# Patient Record
Sex: MALE | Race: WHITE | NOT HISPANIC OR LATINO | Employment: FULL TIME | RURAL
[De-identification: names, ages, dates, MRNs, and addresses within clinical notes are randomized per-mention and may not be internally consistent; named-entity substitution may affect disease eponyms.]

---

## 2021-08-13 ENCOUNTER — TELEPHONE (OUTPATIENT)
Dept: FAMILY MEDICINE CLINIC | Facility: CLINIC | Age: 61
End: 2021-08-13

## 2021-08-13 NOTE — TELEPHONE ENCOUNTER
Patient's wife wyatt stating patient was seen at [facility] ER 8/12/21 for a possible torn  Rotator cuff and needed an ER follow up to get a referral. Patient has not been seen in 5 years in this office. Please advise

## 2021-08-18 ENCOUNTER — OFFICE VISIT (OUTPATIENT)
Dept: ORTHOPEDIC SURGERY | Facility: CLINIC | Age: 61
End: 2021-08-18

## 2021-08-18 VITALS
BODY MASS INDEX: 31.78 KG/M2 | WEIGHT: 222 LBS | HEIGHT: 70 IN | DIASTOLIC BLOOD PRESSURE: 82 MMHG | HEART RATE: 73 BPM | SYSTOLIC BLOOD PRESSURE: 153 MMHG

## 2021-08-18 DIAGNOSIS — M25.512 ACUTE PAIN OF LEFT SHOULDER: Primary | ICD-10-CM

## 2021-08-18 PROCEDURE — 99203 OFFICE O/P NEW LOW 30 MIN: CPT | Performed by: ORTHOPAEDIC SURGERY

## 2021-08-18 NOTE — PROGRESS NOTES
"     Patient ID: Francis Dunlap is a 61 y.o. male.    Chief Complaint:    Chief Complaint   Patient presents with   • Left Shoulder - Initial Evaluation       HPI:  Francis is a 60 yo gentleman with left shoulder pain after a recent fall at work when he caught himself with the left arm. He has had pain and difficulty lifting his arm ever since.  Is a little better in a sling  History reviewed. No pertinent past medical history.    History reviewed. No pertinent surgical history.    History reviewed. No pertinent family history.       Social History     Occupational History   • Not on file   Tobacco Use   • Smoking status: Never Smoker   Substance and Sexual Activity   • Alcohol use: Yes   • Drug use: Not on file   • Sexual activity: Not on file      Review of Systems   Cardiovascular: Negative for chest pain.   Musculoskeletal: Positive for arthralgias.       Objective:    /82   Pulse 73   Ht 177.8 cm (70\")   Wt 101 kg (222 lb)   BMI 31.85 kg/m²     Physical Examination:  Left shoulder has no bruising and intact skin with pain in the impingement area. Passive elevation 160 abduction 110, external rotation 20, internal rotation left hip.  He has pain and weakness on speed cardenas supraspinatus testing.  Belly press and liftoff are 4/5.  Sensory and motor exam are intact all distributions. Radial pulse is palpable and capillary refill is less than two seconds to all digits    Imaging:  left Shoulder X-Ray  Indication: left shoulder pain after fall at work  AP Y and Lateral views  Findings: well maintained joint spaces no fracture  no bony lesion  Soft tissues normal  normal joint spaces  Hardware appropriately positioned not applicable      no prior studies available for comparison.    Assessment:  Left shoulder pain possible cuff tear    Plan:  I recommend MRI to evaluate his cuff      Procedures         Disclaimer: Please note that areas of this note were completed with computer voice recognition software.  " Quite often unanticipated grammatical, syntax, homophones, and other interpretive errors are inadvertently transcribed by the computer software. Please excuse any errors that have escaped final proofreading.

## 2021-08-18 NOTE — PATIENT INSTRUCTIONS
MRI follow-up instructions    Today at your office visit, Dr. Avendano recommended an MRI (magnetic resonance imaging) to evaluate your joint pain.  This requires a precertification process, which our office will do, and then we will contact you when it is approved and go over scheduling options.  We typically recommend these to be performed at [facility] or [facility].  If for some reason it is performed elsewhere please arrange to have that facility give you a disc with your images on it so Dr. Avendano can review it at your follow-up visit.    When checking out today we recommend making an appointment to go over your results in approximately two weeks.  If your MRI is done sooner than that we would be happy to schedule you sooner to go over your results, just contact us at [phone] or through the [facility] portal to let us know your MRI is completed.  Seeing you in person for the results gives us the best opportunity to look at your images together and explain the diagnosis and treatment options to best help you.

## 2021-09-10 ENCOUNTER — HOSPITAL ENCOUNTER (OUTPATIENT)
Dept: MRI IMAGING | Facility: HOSPITAL | Age: 61
Discharge: HOME | End: 2021-09-10
Admitting: ORTHOPAEDIC SURGERY

## 2021-09-10 DIAGNOSIS — M25.512 ACUTE PAIN OF LEFT SHOULDER: ICD-10-CM

## 2021-09-10 PROCEDURE — 73221 MRI JOINT UPR EXTREM W/O DYE: CPT

## 2021-09-13 ENCOUNTER — TELEPHONE (OUTPATIENT)
Dept: ORTHOPEDIC SURGERY | Facility: CLINIC | Age: 61
End: 2021-09-13

## 2021-09-13 NOTE — TELEPHONE ENCOUNTER
Caller: Francis Dunlap    Relationship to patient: Self    Best call back number: [phone]    Patient is needing: PATIENT IS REQUESTING MRI RESULTS. PLEASE CALL PATIENT WHEN AVAILABLE AND LET HIM KNOW IF NEEDS A FOLLOW UP. THANK YOU!

## 2021-09-15 ENCOUNTER — OFFICE VISIT (OUTPATIENT)
Dept: FAMILY MEDICINE CLINIC | Facility: CLINIC | Age: 61
End: 2021-09-15

## 2021-09-15 VITALS
WEIGHT: 221.4 LBS | BODY MASS INDEX: 31.7 KG/M2 | HEIGHT: 70 IN | DIASTOLIC BLOOD PRESSURE: 94 MMHG | SYSTOLIC BLOOD PRESSURE: 160 MMHG | OXYGEN SATURATION: 97 % | HEART RATE: 81 BPM | TEMPERATURE: 97.6 F | RESPIRATION RATE: 18 BRPM

## 2021-09-15 DIAGNOSIS — E66.9 OBESITY (BMI 30.0-34.9): ICD-10-CM

## 2021-09-15 DIAGNOSIS — R25.2 MUSCLE CRAMP: ICD-10-CM

## 2021-09-15 DIAGNOSIS — Z87.891 FORMER SMOKER: ICD-10-CM

## 2021-09-15 DIAGNOSIS — H61.22 IMPACTED CERUMEN OF LEFT EAR: ICD-10-CM

## 2021-09-15 DIAGNOSIS — Z12.5 SCREENING FOR MALIGNANT NEOPLASM OF PROSTATE: ICD-10-CM

## 2021-09-15 DIAGNOSIS — N52.9 ERECTILE DYSFUNCTION, UNSPECIFIED ERECTILE DYSFUNCTION TYPE: ICD-10-CM

## 2021-09-15 DIAGNOSIS — H61.22 IMPACTED CERUMEN, LEFT EAR: ICD-10-CM

## 2021-09-15 DIAGNOSIS — Z13.1 SCREENING FOR DIABETES MELLITUS: ICD-10-CM

## 2021-09-15 DIAGNOSIS — I10 ESSENTIAL HYPERTENSION: Primary | ICD-10-CM

## 2021-09-15 DIAGNOSIS — S46.002D INJURY OF LEFT ROTATOR CUFF, SUBSEQUENT ENCOUNTER: ICD-10-CM

## 2021-09-15 DIAGNOSIS — Z12.11 SCREEN FOR COLON CANCER: ICD-10-CM

## 2021-09-15 DIAGNOSIS — Z13.220 SCREENING, LIPID: ICD-10-CM

## 2021-09-15 PROBLEM — M19.90 ARTHRITIS: Status: ACTIVE | Noted: 2021-09-15

## 2021-09-15 PROBLEM — Z87.442 HISTORY OF KIDNEY STONES: Status: ACTIVE | Noted: 2021-09-15

## 2021-09-15 PROBLEM — N20.0 CALCULUS OF KIDNEY: Status: ACTIVE | Noted: 2021-09-15

## 2021-09-15 PROBLEM — B01.9 VARICELLA: Status: ACTIVE | Noted: 2021-09-15

## 2021-09-15 PROBLEM — Z86.19 HISTORY OF CHICKENPOX: Status: ACTIVE | Noted: 2021-09-15

## 2021-09-15 LAB
DEVELOPER EXPIRATION DATE: NORMAL
DEVELOPER LOT NUMBER: NORMAL
EXPIRATION DATE: NORMAL
FECAL OCCULT BLOOD SCREEN, POC: NEGATIVE
Lab: NORMAL
NEGATIVE CONTROL: NEGATIVE
POSITIVE CONTROL: POSITIVE

## 2021-09-15 PROCEDURE — 82270 OCCULT BLOOD FECES: CPT | Performed by: FAMILY MEDICINE

## 2021-09-15 PROCEDURE — 99204 OFFICE O/P NEW MOD 45 MIN: CPT | Performed by: FAMILY MEDICINE

## 2021-09-15 RX ORDER — SILDENAFIL CITRATE 20 MG/1
TABLET ORAL
Qty: 30 TABLET | Refills: 5 | Status: SHIPPED | OUTPATIENT
Start: 2021-09-15

## 2021-09-15 RX ORDER — LISINOPRIL 5 MG/1
5 TABLET ORAL DAILY
Qty: 90 TABLET | Refills: 2 | Status: SHIPPED | OUTPATIENT
Start: 2021-09-15

## 2021-09-16 ENCOUNTER — OFFICE VISIT (OUTPATIENT)
Dept: ORTHOPEDIC SURGERY | Facility: CLINIC | Age: 61
End: 2021-09-16

## 2021-09-16 VITALS — HEIGHT: 70 IN | WEIGHT: 221 LBS | BODY MASS INDEX: 31.64 KG/M2

## 2021-09-16 DIAGNOSIS — M25.512 ACUTE PAIN OF LEFT SHOULDER: Primary | ICD-10-CM

## 2021-09-16 PROCEDURE — 20610 DRAIN/INJ JOINT/BURSA W/O US: CPT | Performed by: ORTHOPAEDIC SURGERY

## 2021-09-16 PROCEDURE — 99213 OFFICE O/P EST LOW 20 MIN: CPT | Performed by: ORTHOPAEDIC SURGERY

## 2021-09-16 RX ORDER — TRIAMCINOLONE ACETONIDE 40 MG/ML
80 INJECTION, SUSPENSION INTRA-ARTICULAR; INTRAMUSCULAR ONCE
Status: COMPLETED | OUTPATIENT
Start: 2021-09-16 | End: 2021-09-16

## 2021-09-16 RX ADMIN — TRIAMCINOLONE ACETONIDE 80 MG: 40 INJECTION, SUSPENSION INTRA-ARTICULAR; INTRAMUSCULAR at 15:50

## 2021-09-16 NOTE — PROGRESS NOTES
"     Patient ID: Francis Dunlap is a 61 y.o. male.  Left shoulder pain  Returns with continued left shoulder pain    Review of Systems:  Left shoulder pain      Objective:    Ht 177.8 cm (70\")   Wt 100 kg (221 lb)   BMI 31.71 kg/m²     Physical Examination:   Left shoulder has no bruising and intact skin with pain in the impingement area. Passive elevation 160 abduction 110, external rotation 20, internal rotation left hip.  He has pain and weakness on speed cardenas supraspinatus testing.  Belly press and liftoff are 4/5.  Sensory and motor exam are intact all distributions. Radial pulse is palpable and capillary refill is less than two seconds to all digits       Imaging:   MRI demonstrates widespread cuff tendinopathy but overall intact tissue with bursitis AC joint arthritis with bony impingement    Assessment:    Left shoulder pain    Plan:   Treatment options discussed, recommend conservative treatment  I recommend injection after today's evaluation. Risks and benefits of the injection were discussed. Under sterile technique and after timeout and verbal consent I injected 80 mg of Kenalog and 2 mL of 1% Lidocaine in the shoulder and subacromial space. It was well tolerated.  See me as needed      Procedures          Disclaimer: Please note that areas of this note were completed with computer voice recognition software.  Quite often unanticipated grammatical, syntax, homophones, and other interpretive errors are inadvertently transcribed by the computer software. Please excuse any errors that have escaped final proofreading.  "

## 2021-09-19 LAB
ALBUMIN SERPL-MCNC: 4.4 G/DL (ref 3.8–4.8)
ALBUMIN/GLOB SERPL: 1.7 {RATIO} (ref 1.2–2.2)
ALP SERPL-CCNC: 109 IU/L (ref 44–121)
ALT SERPL-CCNC: 25 IU/L (ref 0–44)
AST SERPL-CCNC: 19 IU/L (ref 0–40)
BASOPHILS # BLD AUTO: 0 X10E3/UL (ref 0–0.2)
BASOPHILS NFR BLD AUTO: 0 %
BILIRUB SERPL-MCNC: 0.5 MG/DL (ref 0–1.2)
BUN SERPL-MCNC: 18 MG/DL (ref 8–27)
BUN/CREAT SERPL: 19 (ref 10–24)
CALCIUM SERPL-MCNC: 9.4 MG/DL (ref 8.6–10.2)
CHLORIDE SERPL-SCNC: 104 MMOL/L (ref 96–106)
CHOLEST SERPL-MCNC: 165 MG/DL (ref 100–199)
CO2 SERPL-SCNC: 24 MMOL/L (ref 20–29)
CREAT SERPL-MCNC: 0.93 MG/DL (ref 0.76–1.27)
EOSINOPHIL # BLD AUTO: 0 X10E3/UL (ref 0–0.4)
EOSINOPHIL NFR BLD AUTO: 0 %
ERYTHROCYTE [DISTWIDTH] IN BLOOD BY AUTOMATED COUNT: 13.1 % (ref 11.6–15.4)
GLOBULIN SER CALC-MCNC: 2.6 G/DL (ref 1.5–4.5)
GLUCOSE SERPL-MCNC: 108 MG/DL (ref 65–99)
HBA1C MFR BLD: 6 % (ref 4.8–5.6)
HCT VFR BLD AUTO: 46.4 % (ref 37.5–51)
HDLC SERPL-MCNC: 35 MG/DL
HGB BLD-MCNC: 15.2 G/DL (ref 13–17.7)
IMM GRANULOCYTES # BLD AUTO: 0.1 X10E3/UL (ref 0–0.1)
IMM GRANULOCYTES NFR BLD AUTO: 1 %
LDLC SERPL CALC-MCNC: 116 MG/DL (ref 0–99)
LYMPHOCYTES # BLD AUTO: 1.6 X10E3/UL (ref 0.7–3.1)
LYMPHOCYTES NFR BLD AUTO: 15 %
MAGNESIUM SERPL-MCNC: 2.3 MG/DL (ref 1.6–2.3)
MCH RBC QN AUTO: 30 PG (ref 26.6–33)
MCHC RBC AUTO-ENTMCNC: 32.8 G/DL (ref 31.5–35.7)
MCV RBC AUTO: 92 FL (ref 79–97)
MONOCYTES # BLD AUTO: 0.8 X10E3/UL (ref 0.1–0.9)
MONOCYTES NFR BLD AUTO: 7 %
NEUTROPHILS # BLD AUTO: 8.2 X10E3/UL (ref 1.4–7)
NEUTROPHILS NFR BLD AUTO: 77 %
PLATELET # BLD AUTO: 242 X10E3/UL (ref 150–450)
POTASSIUM SERPL-SCNC: 5 MMOL/L (ref 3.5–5.2)
PROT SERPL-MCNC: 7 G/DL (ref 6–8.5)
PSA SERPL-MCNC: 0.3 NG/ML (ref 0–4)
RBC # BLD AUTO: 5.07 X10E6/UL (ref 4.14–5.8)
SODIUM SERPL-SCNC: 140 MMOL/L (ref 134–144)
TRIGL SERPL-MCNC: 75 MG/DL (ref 0–149)
VLDLC SERPL CALC-MCNC: 14 MG/DL (ref 5–40)
WBC # BLD AUTO: 10.6 X10E3/UL (ref 3.4–10.8)

## 2021-09-24 NOTE — PATIENT INSTRUCTIONS
"Diabetes Care, 44(Suppl 1), S34-S39. https://doi.org/https://doi.org/10.2337/is13-G735\">   Prediabetes  Prediabetes is when your blood sugar (blood glucose) level is higher than normal but not high enough for you to be diagnosed with type 2 diabetes. Having prediabetes puts you at risk for developing type 2 diabetes (type 2 diabetes mellitus).  With certain lifestyle changes, you may be able to prevent or delay the onset of type 2 diabetes. This is important because type 2 diabetes can lead to serious complications, such as:  · Heart disease.  · Stroke.  · Blindness.  · Kidney disease.  · Depression.  · Poor circulation in the feet and legs. In severe cases, this could lead to surgical removal of a leg (amputation).  What are the causes?  The exact cause of prediabetes is not known. It may result from insulin resistance. Insulin resistance develops when cells in the body do not respond properly to insulin that the body makes. This can cause excess glucose to build up in the blood. High blood glucose (hyperglycemia) can develop.  What increases the risk?  The following factors may make you more likely to develop this condition:  · You have a family member with type 2 diabetes.  · You are older than 45 years.  · You had a temporary form of diabetes during a pregnancy (gestational diabetes).  · You had polycystic ovary syndrome (PCOS).  · You are overweight or obese.  · You are inactive (sedentary).  · You have a history of heart disease, including problems with cholesterol levels, high levels of blood fats, or high blood pressure.  What are the signs or symptoms?  You may have no symptoms. If you do have symptoms, they may include:  · Increased hunger.  · Increased thirst.  · Increased urination.  · Vision changes, such as blurry vision.  · Tiredness (fatigue).  How is this diagnosed?  This condition can be diagnosed with blood tests. Your blood glucose may be checked with one or more of the following tests:  · A " fasting blood glucose (FBG) test. You will not be allowed to eat (you will fast) for at least 8 hours before a blood sample is taken.  · An A1C blood test (hemoglobin A1C). This test provides information about blood glucose levels over the previous 2?3 months.  · An oral glucose tolerance test (OGTT). This test measures your blood glucose at two points in time:  ? After fasting. This is your baseline level.  ? Two hours after you drink a beverage that contains glucose.  You may be diagnosed with prediabetes if:  · Your FBG is 100?125 mg/dL (5.6-6.9 mmol/L).  · Your A1C level is 5.7?6.4% (39-46 mmol/mol).  · Your OGTT result is 140?199 mg/dL (7.8-11 mmol/L).  These blood tests may be repeated to confirm your diagnosis.  How is this treated?  Treatment may include dietary and lifestyle changes to help lower your blood glucose and prevent type 2 diabetes from developing. In some cases, medicine may be prescribed to help lower the risk of type 2 diabetes.  Follow these instructions at home:  Nutrition    · Follow a healthy meal plan. This includes eating lean proteins, whole grains, legumes, fresh fruits and vegetables, low-fat dairy products, and healthy fats.  · Follow instructions from your health care provider about eating or drinking restrictions.  · Meet with a dietitian to create a healthy eating plan that is right for you.    Lifestyle  · Do moderate-intensity exercise for at least 30 minutes a day on 5 or more days each week, or as told by your health care provider. A mix of activities may be best, such as:  ? Brisk walking, swimming, biking, and weight lifting.  · Lose weight as told by your health care provider. Losing 5-7% of your body weight can reverse insulin resistance.  · Do not drink alcohol if:  ? Your health care provider tells you not to drink.  ? You are pregnant, may be pregnant, or are planning to become pregnant.  · If you drink alcohol:  ? Limit how much you use to:  § 0-1 drink a day for  women.  § 0-2 drinks a day for men.  ? Be aware of how much alcohol is in your drink. In the [place] one drink equals one 12 oz bottle of beer (355 mL), one 5 oz glass of wine (148 mL), or one 1½ oz glass of hard liquor (44 mL).  General instructions  · Take over-the-counter and prescription medicines only as told by your health care provider. You may be prescribed medicines that help lower the risk of type 2 diabetes.  · Do not use any products that contain nicotine or tobacco, such as cigarettes, e-cigarettes, and chewing tobacco. If you need help quitting, ask your health care provider.  · Keep all follow-up visits. This is important.  Where to find more information  · American Diabetes Association: www.diabetes.org  · Academy of Nutrition and Dietetics: www.eatright.org  · American Heart Association: www.heart.org  Contact a health care provider if:  · You have any of these symptoms:  ? Increased hunger.  ? Increased urination.  ? Increased thirst.  ? Fatigue.  ? Vision changes, such as blurry vision.  Get help right away if you:  · Have shortness of breath.  · Feel confused.  · Vomit or feel like you may vomit.  Summary  · Prediabetes is when your blood sugar (blood glucose)level is higher than normal but not high enough for you to be diagnosed with type 2 diabetes.  · Having prediabetes puts you at risk for developing type 2 diabetes (type 2 diabetes mellitus).  · Make lifestyle changes such as eating a healthy diet and exercising regularly to help prevent diabetes. Lose weight as told by your health care provider.  This information is not intended to replace advice given to you by your health care provider. Make sure you discuss any questions you have with your health care provider.  Document Revised: 03/18/2021 Document Reviewed: 03/18/2021  Elsevier Patient Education © 2021 [employer] Inc[employer]

## 2021-09-29 ENCOUNTER — PATIENT ROUNDING (BHMG ONLY) (OUTPATIENT)
Dept: FAMILY MEDICINE CLINIC | Facility: CLINIC | Age: 61
End: 2021-09-29

## 2021-09-29 NOTE — PROGRESS NOTES
September 29, 2021    Hello, may I speak with Francis Dunlap?    My name is Danna Huang      I am  with [facility]  [address] [place] [address].    Before we get started may I verify your date of birth? 1960    I am calling to officially welcome you to our practice and ask about your recent visit. Is this a good time to talk? yes    Tell me about your visit with us. What things went well?  He thought everything went well.       We're always looking for ways to make our patients' experiences even better. Do you have recommendations on ways we may improve?  no    Overall were you satisfied with your first visit to our practice? yes       I appreciate you taking the time to speak with me today. Is there anything else I can do for you? no      Thank you, and have a great day.

## 2021-10-04 PROBLEM — E66.9 OBESITY (BMI 30.0-34.9): Status: ACTIVE | Noted: 2021-10-04

## 2021-10-04 PROBLEM — I10 ESSENTIAL HYPERTENSION: Status: ACTIVE | Noted: 2021-10-04

## 2021-10-04 PROBLEM — E66.811 OBESITY (BMI 30.0-34.9): Status: ACTIVE | Noted: 2021-10-04

## 2021-10-04 PROBLEM — N52.9 ERECTILE DYSFUNCTION: Status: ACTIVE | Noted: 2021-10-04

## 2022-01-24 ENCOUNTER — TELEPHONE (OUTPATIENT)
Dept: FAMILY MEDICINE CLINIC | Facility: CLINIC | Age: 62
End: 2022-01-24

## 2022-01-24 NOTE — TELEPHONE ENCOUNTER
Caller: JACINTO ANDREW    Relationship to patient: Emergency Contact    Best call back number: [phone]    Date of exposure: 1/16    Date of positive COVID19 test: 1/24    Date if possible COVID19 exposure: AT [employer]    COVID19 symptoms: HEADACHE, COUGH, RUNNY NOSE , CONGESTION, BODY ACHES, CHILLS AND STILL HAS TASTE AND SMELL    Date of initial quarantine: 1/24    Additional information or concerns: PLEASE CONTACT PATIENT BACK WITH CARE INSTRUCTIONS AND ANY MEDICATIONS,    What is the patients preferred pharmacy: [id number] [phone]

## 2022-01-24 NOTE — TELEPHONE ENCOUNTER
Most cases of COVID that we are seeing right now will resolve on their own in about 5-7 days.  Most patients take OTC cough and cold medications as needed for symptoms.  Take vitamins (Vit C, Zinc, Vit D) to help support the immune system.  Isolate for at least 5 days, wear mask around others for an additional 5 days.  Family members should quarantine.      If significant coughing, wheezing, shortness of breath, fever or any symptom for which he needs a prescription, OR if he is interested in an infusion or oral antiviral, we will need to set up an appointment to discuss options.

## 2022-01-25 NOTE — TELEPHONE ENCOUNTER
Spoke with Francis, gave information, he said he is using otc meds and he is feeling better, will call for an appointment if symptom would change.

## 2023-04-20 ENCOUNTER — OFFICE VISIT (OUTPATIENT)
Dept: FAMILY MEDICINE CLINIC | Facility: CLINIC | Age: 63
End: 2023-04-20
Payer: COMMERCIAL

## 2023-04-20 VITALS
HEART RATE: 60 BPM | BODY MASS INDEX: 29.2 KG/M2 | HEIGHT: 70 IN | DIASTOLIC BLOOD PRESSURE: 80 MMHG | TEMPERATURE: 98.3 F | RESPIRATION RATE: 16 BRPM | WEIGHT: 204 LBS | SYSTOLIC BLOOD PRESSURE: 137 MMHG | OXYGEN SATURATION: 96 %

## 2023-04-20 DIAGNOSIS — K21.00 GASTROESOPHAGEAL REFLUX DISEASE WITH ESOPHAGITIS WITHOUT HEMORRHAGE: ICD-10-CM

## 2023-04-20 DIAGNOSIS — H61.22 IMPACTED CERUMEN OF LEFT EAR: ICD-10-CM

## 2023-04-20 DIAGNOSIS — N48.6 PEYRONIE DISEASE: ICD-10-CM

## 2023-04-20 DIAGNOSIS — Z12.5 SCREENING FOR MALIGNANT NEOPLASM OF PROSTATE: ICD-10-CM

## 2023-04-20 DIAGNOSIS — H65.91 MIDDLE EAR EFFUSION, RIGHT: ICD-10-CM

## 2023-04-20 DIAGNOSIS — R73.03 PREDIABETES: ICD-10-CM

## 2023-04-20 DIAGNOSIS — E66.9 OBESITY (BMI 30.0-34.9): ICD-10-CM

## 2023-04-20 DIAGNOSIS — N52.9 ERECTILE DYSFUNCTION, UNSPECIFIED ERECTILE DYSFUNCTION TYPE: ICD-10-CM

## 2023-04-20 DIAGNOSIS — Z13.29 SCREENING FOR THYROID DISORDER: ICD-10-CM

## 2023-04-20 DIAGNOSIS — I10 ESSENTIAL HYPERTENSION: Primary | ICD-10-CM

## 2023-04-20 DIAGNOSIS — L98.9 SKIN LESIONS: ICD-10-CM

## 2023-04-20 DIAGNOSIS — M19.041 ARTHRITIS OF HAND, RIGHT: ICD-10-CM

## 2023-04-20 DIAGNOSIS — L98.9 SKIN LESION OF BACK: ICD-10-CM

## 2023-04-20 DIAGNOSIS — Z12.11 SCREENING FOR COLON CANCER: ICD-10-CM

## 2023-04-20 DIAGNOSIS — K40.90 INGUINAL HERNIA OF RIGHT SIDE WITHOUT OBSTRUCTION OR GANGRENE: ICD-10-CM

## 2023-04-20 LAB
EXPIRATION DATE: NORMAL
HBA1C MFR BLD: 5.5 %
Lab: NORMAL

## 2023-04-20 RX ORDER — SILDENAFIL CITRATE 20 MG/1
20 TABLET ORAL DAILY PRN
Qty: 30 TABLET | Refills: 5 | Status: SHIPPED | OUTPATIENT
Start: 2023-04-20

## 2023-04-20 RX ORDER — AZITHROMYCIN 250 MG/1
TABLET, FILM COATED ORAL
Qty: 6 TABLET | Refills: 0 | Status: SHIPPED | OUTPATIENT
Start: 2023-04-20

## 2023-04-20 RX ORDER — LISINOPRIL 10 MG/1
10 TABLET ORAL DAILY
Qty: 90 TABLET | Refills: 3 | Status: SHIPPED | OUTPATIENT
Start: 2023-04-20

## 2023-04-20 NOTE — PROGRESS NOTES
Francis Dunlap is a 63 y.o. male. Presents to [facility] for   Chief Complaint   Patient presents with   • Establish Care   • Hand Pain       Rooming Tab(CC,VS,Pt Hx,Fall Screen)    SUBJECTIVE:    Patient is here to establish care. He is here with his wife Pattie. Consent given to speak regarding his health with her present.         Hand Pain   The incident occurred more than 1 week ago. The pain is present in the right fingers. The quality of the pain is described as aching and shooting. The pain is mild. The pain has been constant since the incident. Associated symptoms include muscle weakness. Pertinent negatives include no chest pain, numbness or tingling. The symptoms are aggravated by movement. He has tried nothing for the symptoms. The treatment provided no relief.      Reports hand pain with weather change.  Reports his pain is getting worse.  Right knuckles are inflamed.  He denies any history of gout.  Rates pain a 3-4.  He reports localized shooting pain to the hand when he hits it accidentally or jams his finger.  He does have a history of right tendon repair greater than twenty years ago. 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          Patient Active Problem List    Diagnosis    • Peyronie disease [N48.6]    • Inguinal hernia of right side without obstruction or gangrene [K40.90]    • Gastroesophageal reflux disease with esophagitis without hemorrhage [K21.00]    • Obesity (BMI 30.0-34.9) [E66.9]    • Erectile dysfunction [N52.9]    • Essential hypertension [I10]    • Arthritis [M19.90]    • Former smoker [Z87.891]    • History of chickenpox [Z86.19]    • History of kidney stones [Z87.442]        No Known Allergies    Medication List:  Current Outpatient Medications on File Prior to Visit   Medication Sig Dispense Refill   • [DISCONTINUED] lisinopril (PRINIVIL,ZESTRIL) 5 MG tablet Take 1 tablet by mouth Daily. 90 tablet 2   •  "[DISCONTINUED] sildenafil (REVATIO) 20 MG tablet Take 1 to 5 tablets 30 minutes before intercourse. 30 tablet 5     No current facility-administered medications on file prior to visit.     Current outpatient and discharge medications have been reconciled for the patient.  Reviewed by: ZEINA Lan      Review of Systems   Constitutional: Negative for chills, fatigue and fever.   HENT: Positive for hearing loss and trouble swallowing (occasional choking sensation ). Negative for congestion.    Eyes: Positive for visual disturbance (age related.).   Respiratory: Positive for wheezing. Negative for cough and shortness of breath.         \"Barrel lungs\"    Cardiovascular: Negative for chest pain, palpitations and leg swelling.   Gastrointestinal: Positive for diarrhea and GERD. Negative for abdominal distention, abdominal pain, anal bleeding, blood in stool, constipation, nausea, vomiting and indigestion.        Inguinal hernia    Genitourinary: Positive for decreased libido, penile pain and erectile dysfunction. Negative for difficulty urinating, discharge, flank pain, frequency, genital sores, hematuria and testicular pain.        Prolonged urination    Musculoskeletal: Positive for joint swelling (right hand joint swelling ).   Skin: Positive for skin lesions.        Tanned skin    Allergic/Immunologic: Negative for environmental allergies and food allergies.   Neurological: Negative for dizziness, tingling, facial asymmetry, weakness, numbness and confusion.   Psychiatric/Behavioral: Negative for self-injury, sleep disturbance, suicidal ideas, depressed mood and stress. The patient is not nervous/anxious.        Past Medical History:   Diagnosis Date   • Hypertension        No past medical history pertinent negatives.    History reviewed. No pertinent surgical history.    Social History     Socioeconomic History   • Marital status:    Tobacco Use   • Smoking status: Former     Packs/day: 1.00     " "Years: 20.00     Pack years: 20.00     Types: Cigarettes     Quit date: 2008     Years since quitting: 15.3   • Smokeless tobacco: Never   Vaping Use   • Vaping Use: Never used   Substance and Sexual Activity   • Alcohol use: Never   • Drug use: Never   • Sexual activity: Defer       Family History   Problem Relation Age of Onset   • Lung cancer Mother    • Heart disease Mother        OBJECTIVE:    Vitals:    04/20/23 1101   BP: 137/80   BP Location: Left arm   Patient Position: Sitting   Cuff Size: Large Adult   Pulse: 60   Resp: 16   Temp: 98.3 °F (36.8 °C)   TempSrc: Infrared   SpO2: 96%   Weight: 92.5 kg (204 lb)   Height: 177.8 cm (70\")       Estimated body mass index is 29.27 kg/m² as calculated from the following:    Height as of this encounter: 177.8 cm (70\").    Weight as of this encounter: 92.5 kg (204 lb).   **  Physical Exam  Vitals and nursing note reviewed.   Constitutional:       General: He is not in acute distress.     Appearance: Normal appearance. He is well-groomed and normal weight. He is not ill-appearing, toxic-appearing or diaphoretic.   HENT:      Head: Normocephalic and atraumatic.      Right Ear: Tenderness present. There is impacted cerumen.      Left Ear: No tenderness. There is impacted cerumen.      Nose: Nose normal.      Right Sinus: No maxillary sinus tenderness or frontal sinus tenderness.      Left Sinus: No maxillary sinus tenderness or frontal sinus tenderness.      Mouth/Throat:      Lips: Pink.      Mouth: Mucous membranes are moist.   Eyes:      General: Vision grossly intact. Gaze aligned appropriately.   Neck:      Vascular: No carotid bruit.   Cardiovascular:      Rate and Rhythm: Normal rate and regular rhythm.      Heart sounds: Normal heart sounds. No murmur heard.  Pulmonary:      Effort: Pulmonary effort is normal.      Breath sounds: Normal breath sounds and air entry.   Musculoskeletal:      Right lower leg: No edema.      Left lower leg: No edema.   Skin:     " General: Skin is warm and dry.      Capillary Refill: Capillary refill takes less than 2 seconds.      Findings: Lesion present.             Comments: Multiple macular papular lesions noted to body.Concerning macular paupular lesion approx 4mm in size, irregular border to right upper back.     Skin reddened/tanned    Neurological:      Mental Status: He is alert and oriented to person, place, and time. Mental status is at baseline.   Psychiatric:         Attention and Perception: Attention normal.         Mood and Affect: Mood normal.         Behavior: Behavior normal. Behavior is cooperative.       Derm Physical Exam    Result Review :                    PHQ-9 Total Score:             ASSESSMENT/ PLAN:    Data Reviewed:   Assessment and Plan      Diagnoses and all orders for this visit:    1. Essential hypertension (Primary)  Comments:  Home b/p ranges - 152/88 today, has been 170/92.  Orders:  -     CBC & Differential  -     Comprehensive Metabolic Panel  -     Lipid Panel  -     POC Urinalysis Dipstick, Multipro  -     lisinopril (PRINIVIL,ZESTRIL) 10 MG tablet; Take 1 tablet by mouth Daily.  Dispense: 90 tablet; Refill: 3    2. Erectile dysfunction, unspecified erectile dysfunction type  Comments:  Able to achieve a soft erection, but unable to sustain and ejaculate.   Orders:  -     sildenafil (REVATIO) 20 MG tablet; Take 1 tablet by mouth Daily As Needed (ed). Take 1 to 5 tablets 30 minutes before intercourse.  Dispense: 30 tablet; Refill: 5  -     Ambulatory Referral to Urology    3. Inguinal hernia of right side without obstruction or gangrene  Comments:  Patient denies concern or pain.     4. Gastroesophageal reflux disease with esophagitis without hemorrhage    5. Peyronie disease  Comments:  2-3 years with spouse reporting worsening of a change in the shape of his penis - they report a curvature of the penis with erection.   Overview:  2-3 years with spouse reporting worsening of a change in the shape of  his penis - they report a curvature of the penis with erection.     Orders:  -     Ambulatory Referral to Urology    6. Arthritis of hand, right  Comments:  Pending lab results will prescribe medication. Refuses referral to hand specialist.     7. Prediabetes  -     POC Glycosylated Hemoglobin (Hb A1C)    8. Obesity (BMI 30.0-34.9)    9. Middle ear effusion, right  -     azithromycin (Zithromax Z-West) 250 MG tablet; Take 2 tablets by mouth on day 1, then 1 tablet daily on days 2-5  Dispense: 6 tablet; Refill: 0    10. Screening for malignant neoplasm of prostate  -     PSA Screen    11. Skin lesion of back  Comments:  macular paupular lesion approx 4mm in size, irregular border.   Orders:  -     Ambulatory Referral to Dermatology    12. Skin lesions  Comments:  Multiple macular paupular lesions to bilateral arms.   Orders:  -     Ambulatory Referral to Dermatology    13. Impacted cerumen of left ear  -     Ear Cerumen Removal    14. Screening for thyroid disorder  -     TSH    15. Screening for colon cancer  Comments:  Colonoscopy 4/28/2016 [facility]. hyperplastic polyp.   Was supposed to return in three years.   Orders:  -     Ambulatory Referral For Screening Colonoscopy       Essential hypertension [I10]             Wrapup Tab  Follow Up   Requested Prescriptions     Signed Prescriptions Disp Refills   • lisinopril (PRINIVIL,ZESTRIL) 10 MG tablet 90 tablet 3     Sig: Take 1 tablet by mouth Daily.   • sildenafil (REVATIO) 20 MG tablet 30 tablet 5     Sig: Take 1 tablet by mouth Daily As Needed (ed). Take 1 to 5 tablets 30 minutes before intercourse.   • azithromycin (Zithromax Z-West) 250 MG tablet 6 tablet 0     Sig: Take 2 tablets by mouth on day 1, then 1 tablet daily on days 2-5     Medications Discontinued During This Encounter   Medication Reason   • lisinopril (PRINIVIL,ZESTRIL) 5 MG tablet    • sildenafil (REVATIO) 20 MG tablet      Return for Annual physical. Next appointment with this provider is Visit date  not found.      Patient was given instructions and counseling regarding his condition or for health maintenance advice. Please see specific information pulled into the AVS if appropriate.    Patient Instructions   Continue current plan of care as discussed.   Take medication as ordered (if applicable).    Practice good sleep hygiene.  Eat a well balanced diet with fresh fruit and vegetables.    Drink at least 8 bottles of water or equivalent per day.     Limit sweetened beverages, sodas, juices.    Bake, boil, broil or grill your food, avoid eating fried foods.   Exercise at least 150 minutes per week.        Hand hygiene was performed during entrance to exam room and following assessment of patient. This document is intended for medical expert use only.     EMR Dragon/Transcription disclaimer:   Much of this encounter note is an electronic transcription/translation of spoken language to printed text. The electronic translation of spoken language may permit erroneous, or at times, nonsensical words or phrases to be inadvertently transcribed.

## 2023-04-22 LAB
ALBUMIN SERPL-MCNC: 4.2 G/DL (ref 3.8–4.8)
ALBUMIN/GLOB SERPL: 1.6 {RATIO} (ref 1.2–2.2)
ALP SERPL-CCNC: 122 IU/L (ref 44–121)
ALT SERPL-CCNC: 22 IU/L (ref 0–44)
AST SERPL-CCNC: 20 IU/L (ref 0–40)
BASOPHILS # BLD AUTO: 0.1 X10E3/UL (ref 0–0.2)
BASOPHILS NFR BLD AUTO: 1 %
BILIRUB SERPL-MCNC: 0.3 MG/DL (ref 0–1.2)
BUN SERPL-MCNC: 17 MG/DL (ref 8–27)
BUN/CREAT SERPL: 17 (ref 10–24)
CALCIUM SERPL-MCNC: 9.7 MG/DL (ref 8.6–10.2)
CHLORIDE SERPL-SCNC: 105 MMOL/L (ref 96–106)
CHOLEST SERPL-MCNC: 144 MG/DL (ref 100–199)
CO2 SERPL-SCNC: 24 MMOL/L (ref 20–29)
CREAT SERPL-MCNC: 0.98 MG/DL (ref 0.76–1.27)
EGFRCR SERPLBLD CKD-EPI 2021: 87 ML/MIN/1.73
EOSINOPHIL # BLD AUTO: 0.2 X10E3/UL (ref 0–0.4)
EOSINOPHIL NFR BLD AUTO: 3 %
ERYTHROCYTE [DISTWIDTH] IN BLOOD BY AUTOMATED COUNT: 12.7 % (ref 11.6–15.4)
GLOBULIN SER CALC-MCNC: 2.7 G/DL (ref 1.5–4.5)
GLUCOSE SERPL-MCNC: 102 MG/DL (ref 70–99)
HCT VFR BLD AUTO: 46.5 % (ref 37.5–51)
HDLC SERPL-MCNC: 36 MG/DL
HGB BLD-MCNC: 16.1 G/DL (ref 13–17.7)
IMM GRANULOCYTES # BLD AUTO: 0 X10E3/UL (ref 0–0.1)
IMM GRANULOCYTES NFR BLD AUTO: 0 %
LDLC SERPL CALC-MCNC: 97 MG/DL (ref 0–99)
LYMPHOCYTES # BLD AUTO: 1.5 X10E3/UL (ref 0.7–3.1)
LYMPHOCYTES NFR BLD AUTO: 25 %
MCH RBC QN AUTO: 29.6 PG (ref 26.6–33)
MCHC RBC AUTO-ENTMCNC: 34.6 G/DL (ref 31.5–35.7)
MCV RBC AUTO: 86 FL (ref 79–97)
MONOCYTES # BLD AUTO: 0.6 X10E3/UL (ref 0.1–0.9)
MONOCYTES NFR BLD AUTO: 10 %
NEUTROPHILS # BLD AUTO: 3.7 X10E3/UL (ref 1.4–7)
NEUTROPHILS NFR BLD AUTO: 61 %
PLATELET # BLD AUTO: 213 X10E3/UL (ref 150–450)
POTASSIUM SERPL-SCNC: 5.1 MMOL/L (ref 3.5–5.2)
PROT SERPL-MCNC: 6.9 G/DL (ref 6–8.5)
PSA SERPL-MCNC: 0.4 NG/ML (ref 0–4)
RBC # BLD AUTO: 5.44 X10E6/UL (ref 4.14–5.8)
SODIUM SERPL-SCNC: 140 MMOL/L (ref 134–144)
TRIGL SERPL-MCNC: 52 MG/DL (ref 0–149)
TSH SERPL DL<=0.005 MIU/L-ACNC: 1.64 UIU/ML (ref 0.45–4.5)
VLDLC SERPL CALC-MCNC: 11 MG/DL (ref 5–40)
WBC # BLD AUTO: 6.1 X10E3/UL (ref 3.4–10.8)

## 2023-05-16 ENCOUNTER — OFFICE VISIT (OUTPATIENT)
Dept: FAMILY MEDICINE CLINIC | Facility: CLINIC | Age: 63
End: 2023-05-16
Payer: COMMERCIAL

## 2023-05-16 VITALS
BODY MASS INDEX: 30.06 KG/M2 | DIASTOLIC BLOOD PRESSURE: 70 MMHG | RESPIRATION RATE: 16 BRPM | HEART RATE: 63 BPM | HEIGHT: 70 IN | TEMPERATURE: 98.1 F | SYSTOLIC BLOOD PRESSURE: 125 MMHG | WEIGHT: 210 LBS | OXYGEN SATURATION: 95 %

## 2023-05-16 DIAGNOSIS — H65.91 MIDDLE EAR EFFUSION, RIGHT: ICD-10-CM

## 2023-05-16 DIAGNOSIS — R74.8 ELEVATED ALKALINE PHOSPHATASE LEVEL: ICD-10-CM

## 2023-05-16 DIAGNOSIS — R07.89 CHEST DISCOMFORT: ICD-10-CM

## 2023-05-16 DIAGNOSIS — R01.2 ABNORMAL HEART SOUNDS: ICD-10-CM

## 2023-05-16 DIAGNOSIS — Z12.11 SCREENING FOR COLON CANCER: ICD-10-CM

## 2023-05-16 DIAGNOSIS — Z00.00 ANNUAL PHYSICAL EXAM: Primary | ICD-10-CM

## 2023-05-16 DIAGNOSIS — R59.0 POSTERIOR CERVICAL LYMPHADENOPATHY: ICD-10-CM

## 2023-05-16 DIAGNOSIS — R42 POSTURAL DIZZINESS: ICD-10-CM

## 2023-05-16 DIAGNOSIS — Z11.59 ENCOUNTER FOR HEPATITIS C SCREENING TEST FOR LOW RISK PATIENT: ICD-10-CM

## 2023-05-16 RX ORDER — AZITHROMYCIN 250 MG/1
TABLET, FILM COATED ORAL
Qty: 6 TABLET | Refills: 0 | Status: SHIPPED | OUTPATIENT
Start: 2023-05-16

## 2023-05-16 NOTE — PATIENT INSTRUCTIONS
[facility]  [facility] [place] [address]  Phone: [phone]    Continue current plan of care as discussed.   Take medication as ordered.    Practice good sleep hygiene.  Eat a well balanced diet with fresh fruit and vegetables.    Drink at least 8 bottles of water or equivalent per day.     Limit sweetened beverages, sodas, juices.    Bake, boil, broil or grill your food, avoid eating fried foods.   Exercise at least 150 minutes per week.       Please use sunscreen and seatbelts.   Avoid tobacco/nicotine products.  Limit intake of alcohol or abstain.

## 2023-05-16 NOTE — PROGRESS NOTES
Francis Dunlap is a 63 y.o. male. Presents to [facility] for   Chief Complaint   Patient presents with   • Annual Exam   • Hypertension     Rooming Tab(CC,VS,Pt Hx,Fall Screen)    SUBJECTIVE:    Hypertension  This is a recurrent problem. The current episode started more than 1 year ago. The problem is unchanged. The problem is controlled. 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 Pertinent negatives include no anxiety, blurred vision, malaise/fatigue, neck pain, orthopnea, palpitations, peripheral edema, PND, shortness of breath or sweats. There are no associated agents to hypertension. Risk factors for coronary artery disease include obesity and male gender. Past treatments include nothing.      Patient Active Problem List    Diagnosis    • Posterior cervical lymphadenopathy [R59.0]    • Abnormal heart sounds [R01.2]    • Elevated alkaline phosphatase level [R74.8]    • Postural dizziness [R42]    • Peyronie disease [N48.6]    • Inguinal hernia of right side without obstruction or gangrene [K40.90]    • Gastroesophageal reflux disease with esophagitis without hemorrhage [K21.00]    • Obesity (BMI 30.0-34.9) [E66.9]    • Erectile dysfunction [N52.9]    • Essential hypertension [I10]    • Arthritis [M19.90]    • Former smoker [Z87.891]    • History of chickenpox [Z86.19]    • History of kidney stones [Z87.442]      No Known Allergies    Medication List:  Current Outpatient Medications on File Prior to Visit   Medication Sig Dispense Refill   • lisinopril (PRINIVIL,ZESTRIL) 10 MG tablet Take 1 tablet by mouth Daily. 90 tablet 3   • sildenafil (REVATIO) 20 MG tablet Take 1 tablet by mouth Daily As Needed (ed). Take 1 to 5 tablets 30 minutes before intercourse. 30 tablet 5     No current facility-administered medications on file prior to visit.     Current outpatient and discharge medications have been reconciled for the patient.  Reviewed by:  "Mago Tai, ZEINA    Review of Systems   Constitutional: Negative for chills, fatigue, fever and malaise/fatigue.   HENT: Negative for congestion, hearing loss and trouble swallowing.    Eyes: Negative for blurred vision and visual disturbance.   Respiratory: Positive for wheezing. Negative for cough and shortness of breath.         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 ). Negative for palpitations, orthopnea, leg swelling and PND.   Gastrointestinal: Negative for abdominal distention, abdominal pain, anal bleeding, blood in stool, constipation, diarrhea, nausea, vomiting, GERD and indigestion.        Inguinal hernia    Genitourinary: Positive for decreased libido and erectile dysfunction. Negative for difficulty urinating, discharge, flank pain, frequency, genital sores, hematuria, penile pain and testicular pain.        Prolonged urination    Musculoskeletal: Positive for joint swelling (right hand joint swelling ). Negative for neck pain.   Skin: Positive for skin lesions.        Tanned skin    Allergic/Immunologic: Negative for environmental allergies and food allergies.   Neurological: Negative for dizziness, facial asymmetry, weakness, numbness and confusion.   Psychiatric/Behavioral: Negative for self-injury, sleep disturbance, suicidal ideas, depressed mood and stress. The patient is not nervous/anxious.      Past Medical History:   Diagnosis Date   • Hypertension      No past medical history pertinent negatives.    History reviewed. No pertinent surgical history.    Social History     Socioeconomic History   • Marital status:    Tobacco Use   • Smoking status: Former     Packs/day: 1.00     Years: 20.00     Pack years: 20.00     Types: Cigarettes     Quit date: 2008     Years since quitting: 15.3   • Smokeless tobacco: Never   Vaping Use   • Vaping Use: Never used   Substance and Sexual Activity   • Alcohol use: Never   • Drug use: Never   • Sexual " "activity: Defer     Family History   Problem Relation Age of Onset   • Lung cancer Mother    • Heart disease Mother      Family history, surgical history, past medical history, Allergies and med's reviewed with patient today and updated in EPIC EMR.     OBJECTIVE:    Vitals:    05/16/23 1443   BP: 125/70   BP Location: Left arm   Patient Position: Sitting   Cuff Size: Large Adult   Pulse: 63   Resp: 16   Temp: 98.1 °F (36.7 °C)   TempSrc: Infrared   SpO2: 95%   Weight: 95.3 kg (210 lb)   Height: 177.8 cm (70\")       Estimated body mass index is 30.13 kg/m² as calculated from the following:    Height as of this encounter: 177.8 cm (70\").    Weight as of this encounter: 95.3 kg (210 lb).     Physical Exam  Vitals and nursing note reviewed.   Constitutional:       General: He is not in acute distress.     Appearance: Normal appearance. He is well-groomed and normal weight. He is not ill-appearing, toxic-appearing or diaphoretic.   HENT:      Head: Normocephalic and atraumatic.      Right Ear: Ear canal and external ear normal. Tenderness present. A middle ear effusion is present.      Left Ear: Tympanic membrane, ear canal and external ear normal. No tenderness.  No middle ear effusion.      Nose: Nose normal.      Mouth/Throat:      Lips: Pink.      Mouth: Mucous membranes are moist.      Pharynx: Oropharynx is clear. Uvula midline.   Eyes:      General: Vision grossly intact. Gaze aligned appropriately.      Extraocular Movements: Extraocular movements intact.      Pupils: Pupils are equal, round, and reactive to light.   Neck:      Thyroid: No thyromegaly.      Vascular: No carotid bruit.     Cardiovascular:      Rate and Rhythm: Normal rate. Rhythm irregular.  Extrasystoles are present.     Heart sounds: No murmur heard.  Pulmonary:      Effort: Pulmonary effort is normal.      Breath sounds: Normal breath sounds and air entry.   Chest:      Chest wall: Mass present.       Abdominal:      General: Abdomen is flat. " Bowel sounds are normal. There is no distension.      Palpations: There is no mass.      Tenderness: There is no abdominal tenderness. There is no right CVA tenderness, left CVA tenderness, guarding or rebound.      Hernia: No hernia is present.   Musculoskeletal:      Cervical back: Normal range of motion and neck supple. No tenderness.      Right lower leg: No edema.      Left lower leg: No edema.   Lymphadenopathy:      Cervical: Cervical adenopathy present.      Right cervical: Posterior cervical adenopathy present. No superficial or deep cervical adenopathy.     Left cervical: No superficial, deep or posterior cervical adenopathy.      Upper Body:      Right upper body: No supraclavicular, axillary, pectoral or epitrochlear adenopathy.      Left upper body: No supraclavicular, axillary, pectoral or epitrochlear adenopathy.   Skin:     General: Skin is warm and dry.      Capillary Refill: Capillary refill takes less than 2 seconds.      Findings: Lesion and rash present.             Comments: Multiple macular papular lesions noted to body.Concerning macular paupular lesion approx 4mm in size, irregular border to right upper back.     Skin reddened/tanned    Neurological:      General: No focal deficit present.      Mental Status: He is alert and oriented to person, place, and time. Mental status is at baseline.   Psychiatric:         Attention and Perception: Attention normal.         Mood and Affect: Mood normal.         Speech: Speech normal.         Behavior: Behavior normal. Behavior is cooperative.         Thought Content: Thought content normal.       Physical Exam   Neck           Result Review :        CMP        4/21/2023    10:55   CMP   Glucose 102     BUN 17     Creatinine 0.98     Sodium 140     Potassium 5.1     Chloride 105     Calcium 9.7     Total Protein 6.9     Albumin 4.2     Globulin 2.7     Total Bilirubin 0.3     Alkaline Phosphatase 122     AST (SGOT) 20     ALT (SGPT) 22      BUN/Creatinine Ratio 17       CBC w/diff        4/21/2023    10:55   CBC w/Diff   WBC 6.1     RBC 5.44     Hemoglobin 16.1     Hematocrit 46.5     MCV 86     MCH 29.6     MCHC 34.6     RDW 12.7     Platelets 213     Neutrophil Rel % 61     Lymphocyte Rel % 25     Monocyte Rel % 10     Eosinophil Rel % 3     Basophil Rel % 1       Lipid Panel        4/21/2023    10:55   Lipid Panel   Total Cholesterol 144     Triglycerides 52     HDL Cholesterol 36     VLDL Cholesterol 11     LDL Cholesterol  97       TSH        4/21/2023    10:55   TSH   TSH 1.640       A1C Last 3 Results        4/20/2023    11:54   HGBA1C Last 3 Results   Hemoglobin A1C 5.5                    PHQ-9 Total Score:             ASSESSMENT/ PLAN:    Data Reviewed:   Assessment and Plan      Diagnoses and all orders for this visit:    1. Annual physical exam (Primary)  Comments:  Discussed preventive wellness, healthy diet and lifestyle, immunizations recommended.    2. Postural dizziness  Comments:  Occurs in the last few months and has been occurring since beginning lisinopril. B/P at home controlled. Will check daily at home.   Overview:  Occurs in the last few months and has been occurring since beginning lisinopril. B/P at home controlled. Will check daily at home.     Orders:  -     ECG 12 Lead  -     Stress test with myocardial perfusion    3. Elevated alkaline phosphatase level  Comments:  No h/o alcohol or drug use.   GGT ordered.    Overview:  No h/o alcohol or drug use.   GGT ordered.      Orders:  -     Gamma GT    4. 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   Orders:  -     ECG 12 Lead  -     Stress test with myocardial perfusion    5. Abnormal heart sounds  -     ECG 12 Lead  -     Stress test with myocardial perfusion    6. Middle ear effusion, right  -     azithromycin (Zithromax Z-West) 250 MG tablet; Take 2 tablets by mouth on day 1,  then 1 tablet daily on days 2-5  Dispense: 6 tablet; Refill: 0    7. Posterior cervical lymphadenopathy  Comments:  right side.  Likely reactive d/t right middle ear effusion.    RX antibiotics and recheck for resolution at next appt.   Overview:  right side      8. Encounter for hepatitis C screening test for low risk patient  -     Hepatitis C Antibody    9. Screening for colon cancer  Comments:  colonoscopy packet given.        Annual physical exam [Z00.00]             Wrapup Tab  Follow Up   Requested Prescriptions     Signed Prescriptions Disp Refills   • azithromycin (Zithromax Z-West) 250 MG tablet 6 tablet 0     Sig: Take 2 tablets by mouth on day 1, then 1 tablet daily on days 2-5     Medications Discontinued During This Encounter   Medication Reason   • azithromycin (Zithromax Z-West) 250 MG tablet *Therapy completed     Return in about 6 months (around 11/16/2023). Next appointment with this provider is Visit date not found.      Patient was given instructions and counseling regarding his condition or for health maintenance advice. Please see specific information pulled into the AVS if appropriate.    Patient Instructions   [facility]  [address] [place] [id number]  Phone: [phone]    Continue current plan of care as discussed.   Take medication as ordered.    Practice good sleep hygiene.  Eat a well balanced diet with fresh fruit and vegetables.    Drink at least 8 bottles of water or equivalent per day.     Limit sweetened beverages, sodas, juices.    Bake, boil, broil or grill your food, avoid eating fried foods.   Exercise at least 150 minutes per week.       Please use sunscreen and seatbelts.   Avoid tobacco/nicotine products.  Limit intake of alcohol or abstain.       Hand hygiene was performed during entrance to exam room and following assessment of patient. This document is intended for medical expert use only.     EMR Dragon/Transcription disclaimer:   Much of  this encounter note is an electronic transcription/translation of spoken language to printed text. The electronic translation of spoken language may permit erroneous, or at times, nonsensical words or phrases to be inadvertently transcribed.

## 2023-05-21 PROBLEM — R42 POSTURAL DIZZINESS: Status: ACTIVE | Noted: 2023-05-21

## 2023-05-21 PROBLEM — R01.2 ABNORMAL HEART SOUNDS: Status: ACTIVE | Noted: 2023-05-21

## 2023-05-21 PROBLEM — R74.8 ELEVATED ALKALINE PHOSPHATASE LEVEL: Status: ACTIVE | Noted: 2023-05-21

## 2023-05-21 PROBLEM — R59.0 POSTERIOR CERVICAL LYMPHADENOPATHY: Status: ACTIVE | Noted: 2023-05-21

## 2023-06-15 ENCOUNTER — HOSPITAL ENCOUNTER (OUTPATIENT)
Dept: NUCLEAR MEDICINE | Facility: HOSPITAL | Age: 63
Discharge: HOME | End: 2023-06-15
Payer: COMMERCIAL

## 2023-06-15 PROCEDURE — 0 TECHNETIUM TETROFOSMIN KIT

## 2023-06-15 PROCEDURE — 93017 CV STRESS TEST TRACING ONLY: CPT

## 2023-06-15 PROCEDURE — A9502 TC99M TETROFOSMIN: HCPCS

## 2023-06-15 PROCEDURE — 78452 HT MUSCLE IMAGE SPECT MULT: CPT

## 2023-06-15 RX ADMIN — TETROFOSMIN 1 DOSE: 1.38 INJECTION, POWDER, LYOPHILIZED, FOR SOLUTION INTRAVENOUS at 08:59

## 2023-06-15 RX ADMIN — TETROFOSMIN 1 DOSE: 1.38 INJECTION, POWDER, LYOPHILIZED, FOR SOLUTION INTRAVENOUS at 08:12

## 2023-06-16 LAB
BH CV REST NUCLEAR ISOTOPE DOSE: 9 MCI
BH CV STRESS BP STAGE 1: NORMAL
BH CV STRESS BP STAGE 2: NORMAL
BH CV STRESS COMMENTS STAGE 1: NORMAL
BH CV STRESS DOSE REGADENOSON STAGE 1: 0.4
BH CV STRESS DURATION MIN STAGE 1: 3
BH CV STRESS DURATION MIN STAGE 2: 3
BH CV STRESS DURATION SEC STAGE 1: 0
BH CV STRESS DURATION SEC STAGE 2: 0
BH CV STRESS GRADE STAGE 1: 10
BH CV STRESS GRADE STAGE 2: 12
BH CV STRESS HR STAGE 1: 112
BH CV STRESS HR STAGE 2: 143
BH CV STRESS METS STAGE 1: 5
BH CV STRESS METS STAGE 2: 7.5
BH CV STRESS NUCLEAR ISOTOPE DOSE: 33 MCI
BH CV STRESS PROTOCOL 1: NORMAL
BH CV STRESS RECOVERY BP: NORMAL MMHG
BH CV STRESS RECOVERY HR: 86 BPM
BH CV STRESS SPEED STAGE 1: 1.7
BH CV STRESS SPEED STAGE 2: 2.5
BH CV STRESS STAGE 1: 1
BH CV STRESS STAGE 2: 2
LV EF NUC BP: 63 %
MAXIMAL PREDICTED HEART RATE: 157 BPM
PERCENT MAX PREDICTED HR: 91.08 %
STRESS BASELINE BP: NORMAL MMHG
STRESS BASELINE HR: 80 BPM
STRESS PERCENT HR: 107 %
STRESS POST ESTIMATED WORKLOAD: 7 METS
STRESS POST EXERCISE DUR MIN: 7 MIN
STRESS POST EXERCISE DUR SEC: 1 SEC
STRESS POST PEAK BP: NORMAL MMHG
STRESS POST PEAK HR: 143 BPM
STRESS TARGET HR: 133 BPM

## 2023-08-10 ENCOUNTER — HOSPITAL ENCOUNTER (OUTPATIENT)
Dept: CARDIOLOGY | Facility: HOSPITAL | Age: 63
Discharge: HOME | End: 2023-08-10

## 2023-08-10 VITALS
WEIGHT: 210 LBS | HEIGHT: 70 IN | SYSTOLIC BLOOD PRESSURE: 131 MMHG | DIASTOLIC BLOOD PRESSURE: 64 MMHG | BODY MASS INDEX: 30.06 KG/M2

## 2023-08-10 DIAGNOSIS — R42 POSTURAL DIZZINESS: ICD-10-CM

## 2023-08-10 PROCEDURE — 93306 TTE W/DOPPLER COMPLETE: CPT

## 2023-08-11 ENCOUNTER — NURSE TRIAGE (OUTPATIENT)
Dept: CALL CENTER | Facility: HOSPITAL | Age: 63
End: 2023-08-11

## 2023-08-11 NOTE — TELEPHONE ENCOUNTER
HUB call - spouse wanting to set up f/u appt for spouse but mentioned having some soa with exertion. HUB unable to reach office.    Spoke with caller who states spouse had echo done yesterday and wants to make f.u appt with PCP to discuss results and further evaluated his SOA with exertion. Denies spouse to have any SOA at rest or with minimal exertion but notes soa with increased exertion. Denies chest pain or any heart or lung issues. Has Hx of HTN - last /90 yesterday but had not taken his meds. States goes down when takes meds. Reports has O2 sat monitor but has not checked when soa occurs.    Encouraged caller to start checking O2 sat and keep log for provider, verbalized understanding. Guidelines followed, protocols reviewed. Call transferred to [facility] at PCP office ([id number]) for further appt for evaluation and treatment.    Reason for Disposition   Patient wants to be seen    Additional Information   Negative: SEVERE difficulty breathing (e.g., struggling for each breath, speaks in single words, pulse > 120)   Negative: Breathing stopped and hasn't returned   Negative: Choking on something   Negative: Bluish (or gray) lips or face   Negative: Difficult to awaken or acting confused (e.g., disoriented, slurred speech)   Negative: Passed out (i.e., fainted, collapsed and was not responding)   Negative: Wheezing started suddenly after medicine, an allergic food, or bee sting   Negative: Stridor (harsh sound while breathing in)   Negative: Slow, shallow and weak breathing   Negative: Sounds like a life-threatening emergency to the triager   Negative: Chest pain   Negative: Wheezing (high pitched whistling sound) and previous asthma attacks or use of asthma medicines   Negative: Difficulty breathing and within 14 days of COVID-19 Exposure   Negative: Difficulty breathing and only present when coughing   Negative: Difficulty breathing and only from stuffy nose   Negative: Difficulty breathing and only from  "stuffy nose or runny nose from common cold   Negative: MODERATE difficulty breathing (e.g., speaks in phrases, SOB even at rest, pulse 100-120) of new-onset or worse than normal   Negative: Oxygen level (e.g., pulse oximetry) 90% or lower   Negative: Wheezing can be heard across the room   Negative: Drooling or spitting out saliva (because can't swallow)   Negative: Any history of prior \"blood clot\" in leg or lungs   Negative: Illness requiring prolonged bedrest in past month (e.g., immobilization, long hospital stay)   Negative: Hip or leg fracture (broken bone) in past month (or had cast on leg or ankle in past month)   Negative: Major surgery in the past month   Negative: Long-distance travel in past month (e.g., car, bus, train, plane; with trip lasting 6 or more hours)   Negative: Cancer treatment in past six months (or has cancer now)   Negative: Extra heartbeats, irregular heart beating, or heart is beating very fast (i.e., 'palpitations')   Negative: Fever > 103 F (39.4 C)   Negative: Fever > 101 F (38.3 C) and over 60 years of age   Negative: Fever > 100.0 F (37.8 C) and bedridden (e.g., nursing home patient, stroke, chronic illness, recovering from surgery)   Negative: Fever > 100.0 F (37.8 C) and diabetes mellitus or weak immune system (e.g., HIV positive, cancer chemo, splenectomy, organ transplant, chronic steroids)   Negative: Periods where breathing stops and then resumes normally and bedridden (e.g., nursing home patient, CVA)   Negative: Pregnant or postpartum (from 0 to 6 weeks after delivery)   Negative: Patient sounds very sick or weak to the triager   Negative: MILD difficulty breathing (e.g., minimal/no SOB at rest, SOB with walking, pulse < 100) of new-onset or worse than normal   Negative: Longstanding difficulty breathing (e.g., CHF, COPD, emphysema) and worse than normal   Negative: Longstanding difficulty breathing and not responding to usual therapy   Negative: Continuous (nonstop) " "coughing    Answer Assessment - Initial Assessment Questions  1. RESPIRATORY STATUS: \"Describe your breathing?\" (e.g., wheezing, shortness of breath, unable to speak, severe coughing)       Noticed  gets winded with exertion. Requesting f/u visit  2. ONSET: \"When did this breathing problem begin?\"       Several weeks  3. PATTERN \"Does the difficult breathing come and go, or has it been constant since it started?\"       Comes and goes. Starts with exertion  4. SEVERITY: \"How bad is your breathing?\" (e.g., mild, moderate, severe)     - MILD: No SOB at rest, mild SOB with walking, speaks normally in sentences, can lie down, no retractions, pulse < 100.     - MODERATE: SOB at rest, SOB with minimal exertion and prefers to sit, cannot lie down flat, speaks in phrases, mild retractions, audible wheezing, pulse 100-120.     - SEVERE: Very SOB at rest, speaks in single words, struggling to breathe, sitting hunched forward, retractions, pulse > 120       Mild  5. RECURRENT SYMPTOM: \"Have you had difficulty breathing before?\" If Yes, ask: \"When was the last time?\" and \"What happened that time?\"       Currently being worked up per PCP  6. CARDIAC HISTORY: \"Do you have any history of heart disease?\" (e.g., heart attack, angina, bypass surgery, angioplasty)       Denies  7. LUNG HISTORY: \"Do you have any history of lung disease?\"  (e.g., pulmonary embolus, asthma, emphysema)      Denies  8. CAUSE: \"What do you think is causing the breathing problem?\"       Unknown  9. OTHER SYMPTOMS: \"Do you have any other symptoms? (e.g., dizziness, runny nose, cough, chest pain, fever)      Dizziness - PCP aware  10. O2 SATURATION MONITOR:  \"Do you use an oxygen saturation monitor (pulse oximeter) at home?\" If Yes, ask: \"What is your reading (oxygen level) today?\" \"What is your usual oxygen saturation reading?\" (e.g., 95%)        Has not checked  11. PREGNANCY: \"Is there any chance you are pregnant?\" \"When was your last menstrual " "period?\"        N/A  12. TRAVEL: \"Have you traveled out of the country in the last month?\" (e.g., travel history, exposures)        No    Protocols used: Breathing Difficulty-ADULT-[place]    "

## 2023-08-14 LAB
BH CV ECHO MEAS - ACS: 1.82 CM
BH CV ECHO MEAS - AO MAX PG: 5.6 MMHG
BH CV ECHO MEAS - AO MEAN PG: 2.9 MMHG
BH CV ECHO MEAS - AO ROOT DIAM: 3.6 CM
BH CV ECHO MEAS - AO V2 MAX: 118 CM/SEC
BH CV ECHO MEAS - AO V2 VTI: 20 CM
BH CV ECHO MEAS - AVA(I,D): 3.8 CM2
BH CV ECHO MEAS - EDV(CUBED): 62 ML
BH CV ECHO MEAS - EDV(MOD-SP4): 81.4 ML
BH CV ECHO MEAS - EF(MOD-SP4): 59.4 %
BH CV ECHO MEAS - ESV(CUBED): 20.2 ML
BH CV ECHO MEAS - ESV(MOD-SP4): 33.1 ML
BH CV ECHO MEAS - FS: 31.1 %
BH CV ECHO MEAS - IVS/LVPW: 1 CM
BH CV ECHO MEAS - IVSD: 1.2 CM
BH CV ECHO MEAS - LA DIMENSION: 3.1 CM
BH CV ECHO MEAS - LV DIASTOLIC VOL/BSA (35-75): 38.2 CM2
BH CV ECHO MEAS - LV MASS(C)D: 161.6 GRAMS
BH CV ECHO MEAS - LV MAX PG: 4.2 MMHG
BH CV ECHO MEAS - LV MEAN PG: 2.3 MMHG
BH CV ECHO MEAS - LV SYSTOLIC VOL/BSA (12-30): 15.5 CM2
BH CV ECHO MEAS - LV V1 MAX: 102.5 CM/SEC
BH CV ECHO MEAS - LV V1 VTI: 19.6 CM
BH CV ECHO MEAS - LVIDD: 4 CM
BH CV ECHO MEAS - LVIDS: 2.7 CM
BH CV ECHO MEAS - LVOT AREA: 3.8 CM2
BH CV ECHO MEAS - LVOT DIAM: 2.21 CM
BH CV ECHO MEAS - LVPWD: 1.19 CM
BH CV ECHO MEAS - MV A MAX VEL: 71.8 CM/SEC
BH CV ECHO MEAS - MV DEC SLOPE: 281.3 CM/SEC2
BH CV ECHO MEAS - MV DEC TIME: 0.2 MSEC
BH CV ECHO MEAS - MV E MAX VEL: 55.9 CM/SEC
BH CV ECHO MEAS - MV E/A: 0.78
BH CV ECHO MEAS - MV MAX PG: 2.38 MMHG
BH CV ECHO MEAS - MV MEAN PG: 1.2 MMHG
BH CV ECHO MEAS - MV V2 VTI: 17.9 CM
BH CV ECHO MEAS - MVA(VTI): 4.2 CM2
BH CV ECHO MEAS - PA ACC TIME: 0.07 SEC
BH CV ECHO MEAS - PA V2 MAX: 78.5 CM/SEC
BH CV ECHO MEAS - RAP SYSTOLE: 3 MMHG
BH CV ECHO MEAS - RV MAX PG: 1.48 MMHG
BH CV ECHO MEAS - RV V1 MAX: 60.7 CM/SEC
BH CV ECHO MEAS - RV V1 VTI: 12.1 CM
BH CV ECHO MEAS - RVDD: 2.8 CM
BH CV ECHO MEAS - RVSP: 14.5 MMHG
BH CV ECHO MEAS - SI(MOD-SP4): 22.7 ML/M2
BH CV ECHO MEAS - SV(LVOT): 75.1 ML
BH CV ECHO MEAS - SV(MOD-SP4): 48.3 ML
BH CV ECHO MEAS - TR MAX PG: 11.5 MMHG
BH CV ECHO MEAS - TR MAX VEL: 169.2 CM/SEC

## 2023-08-14 NOTE — PROGRESS NOTES
Office Note     Name: Francis Dunlap    : 1960     MRN: [id number]     Chief Complaint  Results and Hypertension  Hypertension  Patient does not check blood pressure at home.   Patient reports shortness of breath and headaches.  Patient states medications is not working well.     Subjective     History of Present Illness:  Francis Dunlap is a 63 y.o. male who presents today for follow up on echo test results.  History of Present Illness    No Known Allergies      Current Outpatient Medications:     lisinopril (PRINIVIL,ZESTRIL) 30 MG tablet, Take 1 tablet by mouth Daily., Disp: 90 tablet, Rfl: 1    sildenafil (REVATIO) 20 MG tablet, Take 1 tablet by mouth Daily As Needed (ed). Take 1 to 5 tablets 30 minutes before intercourse., Disp: 30 tablet, Rfl: 5    Past Medical History:   Diagnosis Date    Hypertension        Review of Systems   Constitutional:  Negative for activity change, chills and fever.   HENT:  Negative for ear discharge, ear pain, swollen glands and trouble swallowing.    Eyes:  Negative for blurred vision.   Respiratory:  Negative for cough and shortness of breath.    Cardiovascular:  Negative for chest pain, palpitations and leg swelling.   Gastrointestinal:  Negative for blood in stool, constipation, nausea and vomiting.   Genitourinary:  Positive for erectile dysfunction. Negative for decreased libido, difficulty urinating, genital sores, testicular pain and urinary incontinence.   Musculoskeletal:  Positive for myalgias (muscle cramping).        Shoulder pain   Skin:  Negative for rash and wound.   Neurological:  Negative for dizziness, syncope, weakness and headache.   Psychiatric/Behavioral:  Negative for depressed mood.      Social History     Socioeconomic History    Marital status:    Tobacco Use    Smoking status: Former     Packs/day: 1.00     Years: 20.00     Pack years: 20.00     Types: Cigarettes     Quit date:      Years since quitting: 15.6    Smokeless tobacco:  "Never   Vaping Use    Vaping Use: Never used   Substance and Sexual Activity    Alcohol use: Never    Drug use: Never    Sexual activity: Defer       Family History   Problem Relation Age of Onset    Lung cancer Mother     Heart disease Mother            4/20/2023    11:04 AM   PHQ-2/PHQ-9 Depression Screening   Little Interest or Pleasure in Doing Things 0-->not at all   Feeling Down, Depressed or Hopeless 0-->not at all   PHQ-9: Brief Depression Severity Measure Score 0       Fall Risk Screen:  NORY Fall Risk Assessment has not been completed.      Objective     /83 (BP Location: Right arm, Patient Position: Sitting, Cuff Size: Large Adult)   Pulse 62   Temp 98 øF (36.7 øC) (Infrared)   Resp 16   Ht 177.8 cm (70\")   Wt 96.6 kg (213 lb)   SpO2 96%   BMI 30.56 kg/mý     BP Readings from Last 2 Encounters:   08/15/23 153/83   08/10/23 131/64       Wt Readings from Last 2 Encounters:   08/15/23 96.6 kg (213 lb)   08/10/23 95.3 kg (210 lb)       BMI is >= 30 and <35. (Class 1 Obesity). The following options were offered after discussion;: exercise counseling/recommendations and nutrition counseling/recommendations         Physical Exam  Vitals and nursing note reviewed.   Constitutional:       General: He is not in acute distress.     Appearance: Normal appearance. He is well-groomed and overweight. He is not ill-appearing, toxic-appearing or diaphoretic.   HENT:      Head: Normocephalic and atraumatic.   Eyes:      General: Vision grossly intact.   Neck:      Vascular: No carotid bruit.   Cardiovascular:      Rate and Rhythm: Normal rate and regular rhythm.      Heart sounds: Normal heart sounds. No murmur heard.  Pulmonary:      Effort: Pulmonary effort is normal.      Breath sounds: Normal breath sounds and air entry.   Musculoskeletal:      Right lower leg: No edema.      Left lower leg: No edema.   Skin:     General: Skin is warm and dry.   Neurological:      Mental Status: He is alert and oriented " to person, place, and time. Mental status is at baseline.   Psychiatric:         Attention and Perception: Attention normal.         Mood and Affect: Mood and affect normal.         Behavior: Behavior normal. Behavior is cooperative.         Thought Content: Thought content normal.     Derm Physical Exam  Result Review :     The following data was reviewed by: ZEINA Lan on 08/15/2023:  CMP          4/21/2023    10:55   CMP   Glucose 102    BUN 17    Creatinine 0.98    Sodium 140    Potassium 5.1    Chloride 105    Calcium 9.7    Total Protein 6.9    Albumin 4.2    Globulin 2.7    Total Bilirubin 0.3    Alkaline Phosphatase 122    AST (SGOT) 20    ALT (SGPT) 22    BUN/Creatinine Ratio 17      CBC w/diff          4/21/2023    10:55   CBC w/Diff   WBC 6.1    RBC 5.44    Hemoglobin 16.1    Hematocrit 46.5    MCV 86    MCH 29.6    MCHC 34.6    RDW 12.7    Platelets 213    Neutrophil Rel % 61    Lymphocyte Rel % 25    Monocyte Rel % 10    Eosinophil Rel % 3    Basophil Rel % 1      Lipid Panel          4/21/2023    10:55   Lipid Panel   Total Cholesterol 144    Triglycerides 52    HDL Cholesterol 36    VLDL Cholesterol 11    LDL Cholesterol  97      TSH          4/21/2023    10:55   TSH   TSH 1.640      A1C Last 3 Results          4/20/2023    11:54   HGBA1C Last 3 Results   Hemoglobin A1C 5.5        PSA          4/21/2023    10:55   PSA   PSA 0.4         Data reviewed : Cardiology studies Echo      Assessment and Plan     Procedures  Plan    Diagnoses and all orders for this visit:    1. Encounter to discuss test results (Primary)    2. Essential hypertension  Comments:  Home b/p ranges - 152/88 today, has been 170/92.  Orders:  -     lisinopril (PRINIVIL,ZESTRIL) 30 MG tablet; Take 1 tablet by mouth Daily.  Dispense: 90 tablet; Refill: 1    3. Shortness of breath        Problem List Items Addressed This Visit          Active Problems    Essential hypertension    Relevant Medications    lisinopril  (PRINIVIL,ZESTRIL) 30 MG tablet     Other Visit Diagnoses       Encounter to discuss test results    -  Primary    Shortness of breath                 Follow Up   Wrapup Tab  Return in about 1 month (around 9/15/2023) for Recheck.   Patient Instructions   Keep taking the medication as we discussed.  Work on avoiding excess caffeine, tobacco, alcohol.  Keep stress level controlled.  Exercise, eat a heart healthy low fat diet and drink plenty of water.       Check your blood pressure at home.   B/P recheck.    Patient was given instructions and counseling regarding his condition or for health maintenance advice. Please see specific information pulled into the AVS if appropriate.  Hand hygiene was performed during entrance to exam room and following assessment of patient. This document is intended for medical expert use only.     EMR Dragon/Transcription disclaimer:   Much of this encounter note is an electronic transcription/translation of spoken language to printed text. The electronic translation of spoken language may permit erroneous, or at times, nonsensical words or phrases to be inadvertently transcribed.      ZEINA Lan, FNP-C  [address]  [facility]  [address] [place] [address] [address]  [place] [address] [phone]

## 2023-08-15 ENCOUNTER — OFFICE VISIT (OUTPATIENT)
Dept: FAMILY MEDICINE CLINIC | Facility: CLINIC | Age: 63
End: 2023-08-15

## 2023-08-15 VITALS
DIASTOLIC BLOOD PRESSURE: 83 MMHG | OXYGEN SATURATION: 96 % | HEART RATE: 62 BPM | SYSTOLIC BLOOD PRESSURE: 153 MMHG | TEMPERATURE: 98 F | HEIGHT: 70 IN | RESPIRATION RATE: 16 BRPM | WEIGHT: 213 LBS | BODY MASS INDEX: 30.49 KG/M2

## 2023-08-15 DIAGNOSIS — R06.02 SHORTNESS OF BREATH: ICD-10-CM

## 2023-08-15 DIAGNOSIS — Z71.2 ENCOUNTER TO DISCUSS TEST RESULTS: Primary | ICD-10-CM

## 2023-08-15 DIAGNOSIS — I10 ESSENTIAL HYPERTENSION: ICD-10-CM

## 2023-08-15 RX ORDER — LISINOPRIL 30 MG/1
30 TABLET ORAL DAILY
Qty: 90 TABLET | Refills: 1 | Status: SHIPPED | OUTPATIENT
Start: 2023-08-15

## 2023-08-15 NOTE — PATIENT INSTRUCTIONS
Keep taking the medication as we discussed.  Work on avoiding excess caffeine, tobacco, alcohol.  Keep stress level controlled.  Exercise, eat a heart healthy low fat diet and drink plenty of water.       Check your blood pressure at home.   B/P recheck.

## 2023-09-15 NOTE — TELEPHONE ENCOUNTER
08/14/202317:04 Mago Abida left message that patient may need to go to [facility] is Shortness of air continued.  07:43 CST Called and spoke with patient, he states he is going to the doctor for his echo results now.    Problem: Alteration in Thoughts and Perception  Goal: Treatment Goal: Gain control of psychotic behaviors/thinking, reduce/eliminate presenting symptoms and demonstrate improved reality functioning upon discharge  Outcome: Not Progressing  Goal: Verbalize thoughts and feelings  Description: Interventions:  - Promote a nonjudgmental and trusting relationship with the patient through active listening and therapeutic communication  - Assess patient's level of functioning, behavior and potential for risk  - Engage patient in 1 on 1 interactions  - Encourage patient to express fears, feelings, frustrations, and discuss symptoms    - [place] patient to reality, help patient recognize reality-based thinking   - Administer medications as ordered and assess for potential side effects  - Provide the patient education related to the signs and symptoms of the illness and desired effects of prescribed medications  Outcome: Not Progressing  Goal: Refrain from acting on delusional thinking/internal stimuli  Description: Interventions:  - Monitor patient closely, per order   - Utilize least restrictive measures   - Set reasonable limits, give positive feedback for acceptable   - Administer medications as ordered and monitor of potential side effects  Outcome: Not Progressing  Goal: Agree to be compliant with medication regime, as prescribed and report medication side effects  Description: Interventions:  - Offer appropriate PRN medication and supervise ingestion; conduct AIMS, as needed   Outcome: Not Progressing  Goal: Attend and participate in unit activities, including therapeutic, recreational, and educational groups  Description: Interventions:  -Encourage Visitation and family involvement in care  Outcome: Not Progressing  Goal: Recognize dysfunctional thoughts, communicate reality-based thoughts at the time of discharge  Description: Interventions:  - Provide medication and psycho-education to assist patient in compliance and developing insight into his/her illness   Outcome: Not Progressing  Goal: Complete daily ADLs, including personal hygiene independently, as able  Description: Interventions:  - Observe, teach, and assist patient with ADLS  - Monitor and promote a balance of rest/activity, with adequate nutrition and elimination   Outcome: Not Progressing     Problem: Ineffective Coping  Goal: Demonstrates healthy coping skills  Outcome: Not Progressing  Goal: Participates in unit activities  Description: Interventions:  - Provide therapeutic environment   - Provide required programming   - Redirect inappropriate behaviors   Outcome: Not Progressing     Problem: DISCHARGE PLANNING  Goal: Discharge to home or other facility with appropriate resources  Description: INTERVENTIONS:  - Identify barriers to discharge w/patient and caregiver  - Arrange for needed discharge resources and transportation as appropriate  - Identify discharge learning needs (meds, wound care, etc.)  - Arrange for interpretive services to assist at discharge as needed  - Refer to Case Management Department for coordinating discharge planning if the patient needs post-hospital services based on physician/advanced practitioner order or complex needs related to functional status, cognitive ability, or social support system  Outcome: Not Progressing     Problem: PSYCHOSIS  Goal: Will report no hallucinations or delusions  Description: Interventions:  - Administer medication as  ordered  - Every waking shifts and PRN assess for the presence of hallucinations and or delusions  - Assist with reality testing to support increasing orientation  - Assess if patient's hallucinations or delusions are encouraging self-harm or harm to others and intervene as appropriate  Outcome: Not Progressing     Problem: INVOLUNTARY ADMIT  Goal: Will cooperate with staff recommendations and doctor's orders and will demonstrate appropriate behavior  Description: INTERVENTIONS:  - Treat underlying conditions and offer medication as ordered  - Educate regarding involuntary admission procedures and rules  - Utilize positive consistent limit setting strategies to support patient and staff safety  Outcome: Not Progressing

## 2024-03-22 ENCOUNTER — TELEPHONE (OUTPATIENT)
Dept: FAMILY MEDICINE CLINIC | Facility: CLINIC | Age: 64
End: 2024-03-22
Payer: COMMERCIAL

## 2024-03-23 ENCOUNTER — APPOINTMENT (OUTPATIENT)
Dept: GENERAL RADIOLOGY | Facility: HOSPITAL | Age: 64
End: 2024-03-23
Payer: COMMERCIAL

## 2024-03-23 ENCOUNTER — HOSPITAL ENCOUNTER (OUTPATIENT)
Facility: HOSPITAL | Age: 64
Setting detail: OBSERVATION
Discharge: HOME | End: 2024-03-24
Attending: EMERGENCY MEDICINE | Admitting: EMERGENCY MEDICINE
Payer: COMMERCIAL

## 2024-03-23 DIAGNOSIS — R06.00 DYSPNEA, UNSPECIFIED TYPE: ICD-10-CM

## 2024-03-23 DIAGNOSIS — R07.9 CHEST PAIN, UNSPECIFIED TYPE: Primary | ICD-10-CM

## 2024-03-23 LAB
ALBUMIN SERPL-MCNC: 4.6 G/DL (ref 3.5–5.2)
ALBUMIN/GLOB SERPL: 1.5 G/DL
ALP SERPL-CCNC: 117 U/L (ref 39–117)
ALT SERPL W P-5'-P-CCNC: 31 U/L (ref 1–41)
ANION GAP SERPL CALCULATED.3IONS-SCNC: 11 MMOL/L (ref 5–15)
AST SERPL-CCNC: 26 U/L (ref 1–40)
BASOPHILS # BLD AUTO: 0.04 10*3/MM3 (ref 0–0.2)
BASOPHILS NFR BLD AUTO: 0.8 % (ref 0–1.5)
BILIRUB SERPL-MCNC: 0.4 MG/DL (ref 0–1.2)
BUN SERPL-MCNC: 21 MG/DL (ref 8–23)
BUN/CREAT SERPL: 20.2 (ref 7–25)
CALCIUM SPEC-SCNC: 9.3 MG/DL (ref 8.6–10.5)
CHLORIDE SERPL-SCNC: 103 MMOL/L (ref 98–107)
CO2 SERPL-SCNC: 27 MMOL/L (ref 22–29)
CREAT SERPL-MCNC: 1.04 MG/DL (ref 0.76–1.27)
DEPRECATED RDW RBC AUTO: 41.5 FL (ref 37–54)
EGFRCR SERPLBLD CKD-EPI 2021: 80.2 ML/MIN/1.73
EOSINOPHIL # BLD AUTO: 0.1 10*3/MM3 (ref 0–0.4)
EOSINOPHIL NFR BLD AUTO: 2.1 % (ref 0.3–6.2)
ERYTHROCYTE [DISTWIDTH] IN BLOOD BY AUTOMATED COUNT: 12.6 % (ref 12.3–15.4)
GLOBULIN UR ELPH-MCNC: 3.1 GM/DL
GLUCOSE SERPL-MCNC: 96 MG/DL (ref 65–99)
HCT VFR BLD AUTO: 48.9 % (ref 37.5–51)
HGB BLD-MCNC: 15.9 G/DL (ref 13–17.7)
HOLD SPECIMEN: NORMAL
IMM GRANULOCYTES # BLD AUTO: 0.02 10*3/MM3 (ref 0–0.05)
IMM GRANULOCYTES NFR BLD AUTO: 0.4 % (ref 0–0.5)
LYMPHOCYTES # BLD AUTO: 0.87 10*3/MM3 (ref 0.7–3.1)
LYMPHOCYTES NFR BLD AUTO: 18.3 % (ref 19.6–45.3)
MCH RBC QN AUTO: 29 PG (ref 26.6–33)
MCHC RBC AUTO-ENTMCNC: 32.5 G/DL (ref 31.5–35.7)
MCV RBC AUTO: 89.1 FL (ref 79–97)
MONOCYTES # BLD AUTO: 0.48 10*3/MM3 (ref 0.1–0.9)
MONOCYTES NFR BLD AUTO: 10.1 % (ref 5–12)
NEUTROPHILS NFR BLD AUTO: 3.25 10*3/MM3 (ref 1.7–7)
NEUTROPHILS NFR BLD AUTO: 68.3 % (ref 42.7–76)
NRBC BLD AUTO-RTO: 0 /100 WBC (ref 0–0.2)
NT-PROBNP SERPL-MCNC: <36 PG/ML (ref 0–900)
PLATELET # BLD AUTO: 159 10*3/MM3 (ref 140–450)
PMV BLD AUTO: 9.7 FL (ref 6–12)
POTASSIUM SERPL-SCNC: 4.7 MMOL/L (ref 3.5–5.2)
PROT SERPL-MCNC: 7.7 G/DL (ref 6–8.5)
QT INTERVAL: 309 MS
QTC INTERVAL: 395 MS
RBC # BLD AUTO: 5.49 10*6/MM3 (ref 4.14–5.8)
SODIUM SERPL-SCNC: 141 MMOL/L (ref 136–145)
TROPONIN T SERPL HS-MCNC: 10 NG/L
WBC NRBC COR # BLD AUTO: 4.76 10*3/MM3 (ref 3.4–10.8)
WHOLE BLOOD HOLD COAG: NORMAL
WHOLE BLOOD HOLD SPECIMEN: NORMAL

## 2024-03-23 PROCEDURE — G0378 HOSPITAL OBSERVATION PER HR: HCPCS

## 2024-03-23 PROCEDURE — 71045 X-RAY EXAM CHEST 1 VIEW: CPT

## 2024-03-23 PROCEDURE — 93005 ELECTROCARDIOGRAM TRACING: CPT | Performed by: EMERGENCY MEDICINE

## 2024-03-23 PROCEDURE — 80053 COMPREHEN METABOLIC PANEL: CPT | Performed by: NURSE PRACTITIONER

## 2024-03-23 PROCEDURE — 84484 ASSAY OF TROPONIN QUANT: CPT | Performed by: NURSE PRACTITIONER

## 2024-03-23 PROCEDURE — 85025 COMPLETE CBC W/AUTO DIFF WBC: CPT | Performed by: NURSE PRACTITIONER

## 2024-03-23 PROCEDURE — 99285 EMERGENCY DEPT VISIT HI MDM: CPT

## 2024-03-23 PROCEDURE — 83880 ASSAY OF NATRIURETIC PEPTIDE: CPT | Performed by: NURSE PRACTITIONER

## 2024-03-23 PROCEDURE — 93005 ELECTROCARDIOGRAM TRACING: CPT | Performed by: NURSE PRACTITIONER

## 2024-03-23 RX ORDER — IBUPROFEN 600 MG/1
600 TABLET ORAL ONCE
Status: COMPLETED | OUTPATIENT
Start: 2024-03-23 | End: 2024-03-23

## 2024-03-23 RX ORDER — IBUPROFEN 800 MG/1
800 TABLET ORAL EVERY 6 HOURS PRN
COMMUNITY
End: 2024-04-04

## 2024-03-23 RX ORDER — SODIUM CHLORIDE 0.9 % (FLUSH) 0.9 %
10 SYRINGE (ML) INJECTION AS NEEDED
Status: DISCONTINUED | OUTPATIENT
Start: 2024-03-23 | End: 2024-03-24 | Stop reason: HOSPADM

## 2024-03-23 RX ADMIN — NITROGLYCERIN 1 INCH: 20 OINTMENT TOPICAL at 22:56

## 2024-03-23 RX ADMIN — IBUPROFEN 600 MG: 600 TABLET, FILM COATED ORAL at 23:15

## 2024-03-24 ENCOUNTER — READMISSION MANAGEMENT (OUTPATIENT)
Dept: CALL CENTER | Facility: HOSPITAL | Age: 64
End: 2024-03-24
Payer: COMMERCIAL

## 2024-03-24 ENCOUNTER — APPOINTMENT (OUTPATIENT)
Dept: CT IMAGING | Facility: HOSPITAL | Age: 64
End: 2024-03-24
Payer: COMMERCIAL

## 2024-03-24 ENCOUNTER — APPOINTMENT (OUTPATIENT)
Dept: NUCLEAR MEDICINE | Facility: HOSPITAL | Age: 64
End: 2024-03-24
Payer: COMMERCIAL

## 2024-03-24 VITALS
HEIGHT: 70 IN | TEMPERATURE: 98.5 F | HEART RATE: 88 BPM | DIASTOLIC BLOOD PRESSURE: 85 MMHG | SYSTOLIC BLOOD PRESSURE: 132 MMHG | OXYGEN SATURATION: 92 % | BODY MASS INDEX: 31.62 KG/M2 | WEIGHT: 220.9 LBS | RESPIRATION RATE: 18 BRPM

## 2024-03-24 LAB
ANION GAP SERPL CALCULATED.3IONS-SCNC: 10 MMOL/L (ref 5–15)
B PARAPERT DNA SPEC QL NAA+PROBE: NOT DETECTED
B PERT DNA SPEC QL NAA+PROBE: NOT DETECTED
BH CV REST NUCLEAR ISOTOPE DOSE: 11 MCI
BH CV STRESS BP STAGE 1: NORMAL
BH CV STRESS BP STAGE 2: NORMAL
BH CV STRESS COMMENTS STAGE 1: NORMAL
BH CV STRESS COMMENTS STAGE 2: NORMAL
BH CV STRESS DOSE REGADENOSON STAGE 1: 0.4
BH CV STRESS DURATION MIN STAGE 1: 0
BH CV STRESS DURATION MIN STAGE 2: 4
BH CV STRESS DURATION SEC STAGE 1: 10
BH CV STRESS DURATION SEC STAGE 2: 0
BH CV STRESS HR STAGE 1: 94
BH CV STRESS HR STAGE 2: 94
BH CV STRESS NUCLEAR ISOTOPE DOSE: 33 MCI
BH CV STRESS PROTOCOL 1: NORMAL
BH CV STRESS RECOVERY BP: NORMAL MMHG
BH CV STRESS RECOVERY HR: 88 BPM
BH CV STRESS STAGE 1: 1
BH CV STRESS STAGE 2: 2
BUN SERPL-MCNC: 21 MG/DL (ref 8–23)
BUN/CREAT SERPL: 23.6 (ref 7–25)
C PNEUM DNA NPH QL NAA+NON-PROBE: NOT DETECTED
CALCIUM SPEC-SCNC: 8.8 MG/DL (ref 8.6–10.5)
CHLORIDE SERPL-SCNC: 105 MMOL/L (ref 98–107)
CHOLEST SERPL-MCNC: 133 MG/DL (ref 0–200)
CO2 SERPL-SCNC: 25 MMOL/L (ref 22–29)
CREAT SERPL-MCNC: 0.89 MG/DL (ref 0.76–1.27)
D DIMER PPP FEU-MCNC: 3.16 MG/L (FEU) (ref 0–0.64)
DEPRECATED RDW RBC AUTO: 41.9 FL (ref 37–54)
EGFRCR SERPLBLD CKD-EPI 2021: 95.7 ML/MIN/1.73
ERYTHROCYTE [DISTWIDTH] IN BLOOD BY AUTOMATED COUNT: 12.8 % (ref 12.3–15.4)
FLUAV SUBTYP SPEC NAA+PROBE: NOT DETECTED
FLUBV RNA ISLT QL NAA+PROBE: NOT DETECTED
GEN 5 2HR TROPONIN T REFLEX: 9 NG/L
GLUCOSE SERPL-MCNC: 114 MG/DL (ref 65–99)
HADV DNA SPEC NAA+PROBE: NOT DETECTED
HBA1C MFR BLD: 5.8 % (ref 4.8–5.6)
HCOV 229E RNA SPEC QL NAA+PROBE: NOT DETECTED
HCOV HKU1 RNA SPEC QL NAA+PROBE: NOT DETECTED
HCOV NL63 RNA SPEC QL NAA+PROBE: NOT DETECTED
HCOV OC43 RNA SPEC QL NAA+PROBE: NOT DETECTED
HCT VFR BLD AUTO: 45.7 % (ref 37.5–51)
HDLC SERPL-MCNC: 22 MG/DL (ref 40–60)
HGB BLD-MCNC: 15 G/DL (ref 13–17.7)
HMPV RNA NPH QL NAA+NON-PROBE: NOT DETECTED
HPIV1 RNA ISLT QL NAA+PROBE: NOT DETECTED
HPIV2 RNA SPEC QL NAA+PROBE: NOT DETECTED
HPIV3 RNA NPH QL NAA+PROBE: NOT DETECTED
HPIV4 P GENE NPH QL NAA+PROBE: NOT DETECTED
LDLC SERPL CALC-MCNC: 94 MG/DL (ref 0–100)
LDLC/HDLC SERPL: 4.25 {RATIO}
LV EF NUC BP: 73 %
M PNEUMO IGG SER IA-ACNC: NOT DETECTED
MAXIMAL PREDICTED HEART RATE: 156 BPM
MCH RBC QN AUTO: 29.4 PG (ref 26.6–33)
MCHC RBC AUTO-ENTMCNC: 32.8 G/DL (ref 31.5–35.7)
MCV RBC AUTO: 89.6 FL (ref 79–97)
PERCENT MAX PREDICTED HR: 60.26 %
PLATELET # BLD AUTO: 136 10*3/MM3 (ref 140–450)
PMV BLD AUTO: 9.7 FL (ref 6–12)
POTASSIUM SERPL-SCNC: 3.5 MMOL/L (ref 3.5–5.2)
QT INTERVAL: 312 MS
QTC INTERVAL: 400 MS
RBC # BLD AUTO: 5.1 10*6/MM3 (ref 4.14–5.8)
RHINOVIRUS RNA SPEC NAA+PROBE: NOT DETECTED
RSV RNA NPH QL NAA+NON-PROBE: NOT DETECTED
SARS-COV-2 RNA NPH QL NAA+NON-PROBE: NOT DETECTED
SODIUM SERPL-SCNC: 140 MMOL/L (ref 136–145)
STRESS BASELINE BP: NORMAL MMHG
STRESS BASELINE HR: 79 BPM
STRESS PERCENT HR: 71 %
STRESS POST PEAK BP: NORMAL MMHG
STRESS POST PEAK HR: 94 BPM
STRESS TARGET HR: 133 BPM
TRIGL SERPL-MCNC: 88 MG/DL (ref 0–150)
TROPONIN T DELTA: -1 NG/L
VLDLC SERPL-MCNC: 17 MG/DL (ref 5–40)
WBC NRBC COR # BLD AUTO: 4.12 10*3/MM3 (ref 3.4–10.8)

## 2024-03-24 PROCEDURE — 96374 THER/PROPH/DIAG INJ IV PUSH: CPT

## 2024-03-24 PROCEDURE — 85379 FIBRIN DEGRADATION QUANT: CPT | Performed by: NURSE PRACTITIONER

## 2024-03-24 PROCEDURE — G0378 HOSPITAL OBSERVATION PER HR: HCPCS

## 2024-03-24 PROCEDURE — 78452 HT MUSCLE IMAGE SPECT MULT: CPT

## 2024-03-24 PROCEDURE — 83036 HEMOGLOBIN GLYCOSYLATED A1C: CPT | Performed by: NURSE PRACTITIONER

## 2024-03-24 PROCEDURE — 80061 LIPID PANEL: CPT | Performed by: NURSE PRACTITIONER

## 2024-03-24 PROCEDURE — 25510000001 IOPAMIDOL PER 1 ML: Performed by: EMERGENCY MEDICINE

## 2024-03-24 PROCEDURE — 25010000002 ONDANSETRON PER 1 MG: Performed by: NURSE PRACTITIONER

## 2024-03-24 PROCEDURE — 93016 CV STRESS TEST SUPVJ ONLY: CPT | Performed by: NURSE PRACTITIONER

## 2024-03-24 PROCEDURE — 94618 PULMONARY STRESS TESTING: CPT

## 2024-03-24 PROCEDURE — 85027 COMPLETE CBC AUTOMATED: CPT | Performed by: NURSE PRACTITIONER

## 2024-03-24 PROCEDURE — 80048 BASIC METABOLIC PNL TOTAL CA: CPT | Performed by: NURSE PRACTITIONER

## 2024-03-24 PROCEDURE — 93018 CV STRESS TEST I&R ONLY: CPT | Performed by: INTERNAL MEDICINE

## 2024-03-24 PROCEDURE — 93017 CV STRESS TEST TRACING ONLY: CPT

## 2024-03-24 PROCEDURE — 0202U NFCT DS 22 TRGT SARS-COV-2: CPT | Performed by: NURSE PRACTITIONER

## 2024-03-24 PROCEDURE — 71275 CT ANGIOGRAPHY CHEST: CPT

## 2024-03-24 PROCEDURE — 0 TECHNETIUM TETROFOSMIN KIT: Performed by: EMERGENCY MEDICINE

## 2024-03-24 PROCEDURE — 78452 HT MUSCLE IMAGE SPECT MULT: CPT | Performed by: INTERNAL MEDICINE

## 2024-03-24 PROCEDURE — 84484 ASSAY OF TROPONIN QUANT: CPT | Performed by: NURSE PRACTITIONER

## 2024-03-24 PROCEDURE — A9502 TC99M TETROFOSMIN: HCPCS | Performed by: EMERGENCY MEDICINE

## 2024-03-24 PROCEDURE — 25010000002 REGADENOSON 0.4 MG/5ML SOLUTION: Performed by: EMERGENCY MEDICINE

## 2024-03-24 RX ORDER — BISACODYL 5 MG/1
5 TABLET, DELAYED RELEASE ORAL DAILY PRN
Status: DISCONTINUED | OUTPATIENT
Start: 2024-03-24 | End: 2024-03-24 | Stop reason: HOSPADM

## 2024-03-24 RX ORDER — BISACODYL 10 MG
10 SUPPOSITORY, RECTAL RECTAL DAILY PRN
Status: DISCONTINUED | OUTPATIENT
Start: 2024-03-24 | End: 2024-03-24 | Stop reason: HOSPADM

## 2024-03-24 RX ORDER — ONDANSETRON 4 MG/1
4 TABLET, ORALLY DISINTEGRATING ORAL EVERY 6 HOURS PRN
Status: DISCONTINUED | OUTPATIENT
Start: 2024-03-24 | End: 2024-03-24 | Stop reason: HOSPADM

## 2024-03-24 RX ORDER — LISINOPRIL 10 MG/1
10 TABLET ORAL DAILY
Qty: 30 TABLET | Refills: 2 | Status: SHIPPED | OUTPATIENT
Start: 2024-03-24 | End: 2024-03-26 | Stop reason: SINTOL

## 2024-03-24 RX ORDER — SODIUM CHLORIDE 0.9 % (FLUSH) 0.9 %
10 SYRINGE (ML) INJECTION AS NEEDED
Status: DISCONTINUED | OUTPATIENT
Start: 2024-03-24 | End: 2024-03-24 | Stop reason: HOSPADM

## 2024-03-24 RX ORDER — POTASSIUM CHLORIDE 20 MEQ/1
40 TABLET, EXTENDED RELEASE ORAL ONCE
Status: COMPLETED | OUTPATIENT
Start: 2024-03-24 | End: 2024-03-24

## 2024-03-24 RX ORDER — NALOXONE HCL 0.4 MG/ML
0.4 VIAL (ML) INJECTION
Status: DISCONTINUED | OUTPATIENT
Start: 2024-03-24 | End: 2024-03-24 | Stop reason: HOSPADM

## 2024-03-24 RX ORDER — ONDANSETRON 2 MG/ML
4 INJECTION INTRAMUSCULAR; INTRAVENOUS EVERY 6 HOURS PRN
Status: DISCONTINUED | OUTPATIENT
Start: 2024-03-24 | End: 2024-03-24 | Stop reason: HOSPADM

## 2024-03-24 RX ORDER — REGADENOSON 0.08 MG/ML
0.4 INJECTION, SOLUTION INTRAVENOUS
Status: COMPLETED | OUTPATIENT
Start: 2024-03-24 | End: 2024-03-24

## 2024-03-24 RX ORDER — ALUMINA, MAGNESIA, AND SIMETHICONE 2400; 2400; 240 MG/30ML; MG/30ML; MG/30ML
15 SUSPENSION ORAL EVERY 6 HOURS PRN
Status: DISCONTINUED | OUTPATIENT
Start: 2024-03-24 | End: 2024-03-24 | Stop reason: HOSPADM

## 2024-03-24 RX ORDER — SODIUM CHLORIDE 9 MG/ML
40 INJECTION, SOLUTION INTRAVENOUS AS NEEDED
Status: DISCONTINUED | OUTPATIENT
Start: 2024-03-24 | End: 2024-03-24 | Stop reason: HOSPADM

## 2024-03-24 RX ORDER — ACETAMINOPHEN 160 MG/5ML
650 SOLUTION ORAL EVERY 4 HOURS PRN
Status: DISCONTINUED | OUTPATIENT
Start: 2024-03-24 | End: 2024-03-24 | Stop reason: HOSPADM

## 2024-03-24 RX ORDER — ASPIRIN 81 MG/1
324 TABLET, CHEWABLE ORAL ONCE
Status: COMPLETED | OUTPATIENT
Start: 2024-03-24 | End: 2024-03-24

## 2024-03-24 RX ORDER — POLYETHYLENE GLYCOL 3350 17 G/17G
17 POWDER, FOR SOLUTION ORAL DAILY PRN
Status: DISCONTINUED | OUTPATIENT
Start: 2024-03-24 | End: 2024-03-24 | Stop reason: HOSPADM

## 2024-03-24 RX ORDER — NITROGLYCERIN 0.4 MG/1
0.4 TABLET SUBLINGUAL
Status: DISCONTINUED | OUTPATIENT
Start: 2024-03-24 | End: 2024-03-24 | Stop reason: HOSPADM

## 2024-03-24 RX ORDER — MORPHINE SULFATE 2 MG/ML
1 INJECTION, SOLUTION INTRAMUSCULAR; INTRAVENOUS EVERY 4 HOURS PRN
Status: DISCONTINUED | OUTPATIENT
Start: 2024-03-24 | End: 2024-03-24 | Stop reason: HOSPADM

## 2024-03-24 RX ORDER — SODIUM CHLORIDE 0.9 % (FLUSH) 0.9 %
10 SYRINGE (ML) INJECTION EVERY 12 HOURS SCHEDULED
Status: DISCONTINUED | OUTPATIENT
Start: 2024-03-24 | End: 2024-03-24

## 2024-03-24 RX ORDER — ACETAMINOPHEN 650 MG/1
650 SUPPOSITORY RECTAL EVERY 4 HOURS PRN
Status: DISCONTINUED | OUTPATIENT
Start: 2024-03-24 | End: 2024-03-24 | Stop reason: HOSPADM

## 2024-03-24 RX ORDER — IBUPROFEN 400 MG/1
400 TABLET ORAL EVERY 6 HOURS PRN
Status: DISCONTINUED | OUTPATIENT
Start: 2024-03-24 | End: 2024-03-24 | Stop reason: HOSPADM

## 2024-03-24 RX ORDER — NITROGLYCERIN 0.4 MG/1
0.4 TABLET SUBLINGUAL
Status: DISCONTINUED | OUTPATIENT
Start: 2024-03-24 | End: 2024-03-24 | Stop reason: SDUPTHER

## 2024-03-24 RX ORDER — SODIUM CHLORIDE 0.9 % (FLUSH) 0.9 %
10 SYRINGE (ML) INJECTION AS NEEDED
Status: DISCONTINUED | OUTPATIENT
Start: 2024-03-24 | End: 2024-03-24

## 2024-03-24 RX ORDER — ACETAMINOPHEN 325 MG/1
650 TABLET ORAL EVERY 4 HOURS PRN
Status: DISCONTINUED | OUTPATIENT
Start: 2024-03-24 | End: 2024-03-24 | Stop reason: HOSPADM

## 2024-03-24 RX ORDER — SODIUM CHLORIDE 9 MG/ML
40 INJECTION, SOLUTION INTRAVENOUS AS NEEDED
Status: DISCONTINUED | OUTPATIENT
Start: 2024-03-24 | End: 2024-03-24

## 2024-03-24 RX ORDER — SODIUM CHLORIDE 0.9 % (FLUSH) 0.9 %
10 SYRINGE (ML) INJECTION EVERY 12 HOURS SCHEDULED
Status: DISCONTINUED | OUTPATIENT
Start: 2024-03-24 | End: 2024-03-24 | Stop reason: HOSPADM

## 2024-03-24 RX ORDER — AMOXICILLIN 250 MG
2 CAPSULE ORAL 2 TIMES DAILY
Status: DISCONTINUED | OUTPATIENT
Start: 2024-03-24 | End: 2024-03-24 | Stop reason: HOSPADM

## 2024-03-24 RX ORDER — LISINOPRIL 5 MG/1
10 TABLET ORAL
Status: DISCONTINUED | OUTPATIENT
Start: 2024-03-24 | End: 2024-03-24 | Stop reason: HOSPADM

## 2024-03-24 RX ADMIN — IBUPROFEN 400 MG: 400 TABLET, FILM COATED ORAL at 07:27

## 2024-03-24 RX ADMIN — Medication 10 ML: at 09:42

## 2024-03-24 RX ADMIN — IOPAMIDOL 100 ML: 755 INJECTION, SOLUTION INTRAVENOUS at 13:26

## 2024-03-24 RX ADMIN — LISINOPRIL 10 MG: 5 TABLET ORAL at 11:40

## 2024-03-24 RX ADMIN — ONDANSETRON 4 MG: 2 INJECTION INTRAMUSCULAR; INTRAVENOUS at 13:44

## 2024-03-24 RX ADMIN — POTASSIUM CHLORIDE 40 MEQ: 1500 TABLET, EXTENDED RELEASE ORAL at 11:40

## 2024-03-24 RX ADMIN — ASPIRIN 81 MG CHEWABLE TABLET 324 MG: 81 TABLET CHEWABLE at 09:42

## 2024-03-24 RX ADMIN — REGADENOSON 0.4 MG: 0.08 INJECTION, SOLUTION INTRAVENOUS at 09:04

## 2024-03-24 RX ADMIN — TETROFOSMIN 1 DOSE: 1.38 INJECTION, POWDER, LYOPHILIZED, FOR SOLUTION INTRAVENOUS at 09:04

## 2024-03-24 RX ADMIN — TETROFOSMIN 1 DOSE: 1.38 INJECTION, POWDER, LYOPHILIZED, FOR SOLUTION INTRAVENOUS at 07:13

## 2024-03-24 NOTE — PLAN OF CARE
Goal Outcome Evaluation:  Plan of Care Reviewed With: patient, spouse        Progress: improving

## 2024-03-24 NOTE — PLAN OF CARE
Goal Outcome Evaluation:  Plan of Care Reviewed With: patient, spouse        Progress: improving  Outcome Evaluation: Pt stress test normal. D-dimer elevated and CT PE protocol resulted normal. Pt waiting for walking oximetry, wife at bedside and call light within reach.

## 2024-03-24 NOTE — DISCHARGE SUMMARY
[facility]    Mago Tai ZEINA    CHIEF COMPLAINT:     Chest Pain     HISTORY OF PRESENT ILLNESS:    HPI  ED 2024  Patient is a 64-year-old gentleman who does not take any home medications he comes in today with chest pain that he states is midsternal and radiates into the left arm he states it waxes and wanes in intensity it started yesterday and got worse today. He states he had a stress test about a year ago and it was negative that he has never seen a cardiologist.     Observation 2024  Patient agrees with HPI noted above include intermittent dull chest pain with onset yesterday.  He does report a fever of 101F 2 days ago associated with cough and shortness of breath.  However he states that the shortness of breath has been going on for about 2 weeks and is worse with exertion or laying flat.  Currently reports mild chest pressure.  Patient has high blood pressure but stopped taking lisinopril on his all about 9 months ago.  Wife at bedside.    Past Medical History:   Diagnosis Date    Hypertension      History reviewed. No pertinent surgical history.  Family History   Problem Relation Age of Onset    Lung cancer Mother     Heart disease Mother      Social History     Tobacco Use    Smoking status: Former     Current packs/day: 0.00     Average packs/day: 1 pack/day for 20.0 years (20.0 ttl pk-yrs)     Types: Cigarettes     Start date:      Quit date:      Years since quittin.2    Smokeless tobacco: Never   Vaping Use    Vaping status: Never Used   Substance Use Topics    Alcohol use: Never    Drug use: Never     Medications Prior to Admission   Medication Sig Dispense Refill Last Dose    ibuprofen (ADVIL,MOTRIN) 800 MG tablet Take 1 tablet by mouth Every 6 (Six) Hours As Needed for Mild Pain.        Allergies:  Patient has no known allergies.      There is no immunization history on file for this patient.        REVIEW OF SYSTEMS:    Review of Systems    Constitutional: Positive for fever. Negative for diaphoresis.   HENT:  Negative for congestion.    Cardiovascular:  Positive for chest pain and dyspnea on exertion. Negative for irregular heartbeat, leg swelling, near-syncope, palpitations and syncope.   Respiratory:  Positive for cough and shortness of breath.    Gastrointestinal:  Positive for nausea. Negative for vomiting.   All other systems reviewed and are negative.        Vital Signs  Temp:  [98.5 °F (36.9 °C)-99 °F (37.2 °C)] 98.5 °F (36.9 °C)  Heart Rate:  [] 77  Resp:  [14-18] 18  BP: (102-191)/(65-96) 132/85          Physical Exam:  Physical Exam  Vitals and nursing note reviewed.   Constitutional:       Appearance: Normal appearance. He is obese.   HENT:      Head: Normocephalic and atraumatic.      Right Ear: External ear normal.      Left Ear: External ear normal.      Nose: Nose normal.      Mouth/Throat:      Mouth: Mucous membranes are moist.   Eyes:      Extraocular Movements: Extraocular movements intact.   Cardiovascular:      Rate and Rhythm: Normal rate and regular rhythm.      Pulses: Normal pulses.      Heart sounds: Normal heart sounds.   Pulmonary:      Effort: Pulmonary effort is normal.      Breath sounds: Normal breath sounds.      Comments: O2 92% room air, mildly labored breathing  Abdominal:      General: Abdomen is flat. Bowel sounds are normal.      Palpations: Abdomen is soft.   Musculoskeletal:         General: Normal range of motion.      Cervical back: Normal range of motion.   Skin:     General: Skin is warm.   Neurological:      Mental Status: He is alert and oriented to person, place, and time.   Psychiatric:         Behavior: Behavior normal.         Thought Content: Thought content normal.         Judgment: Judgment normal.           Emotional Behavior:    Normal    Debilities:   None  Results Review:    I reviewed the patient's new clinical results.  Lab Results (most recent)       Procedure Component Value Units  Date/Time    Basic Metabolic Panel [[id number]]  (Abnormal) Collected: 03/24/24 0949    Specimen: Blood from Arm, Left Updated: 03/24/24 1032     Glucose 114 mg/dL      BUN 21 mg/dL      Creatinine 0.89 mg/dL      Sodium 140 mmol/L      Potassium 3.5 mmol/L      Chloride 105 mmol/L      CO2 25.0 mmol/L      Calcium 8.8 mg/dL      BUN/Creatinine Ratio 23.6     Anion Gap 10.0 mmol/L      eGFR 95.7 mL/min/1.73     Narrative:      GFR Normal >60  Chronic Kidney Disease <60  Kidney Failure <15      CBC (No Diff) [[id number]]  (Abnormal) Collected: 03/24/24 0949    Specimen: Blood from Arm, Left Updated: 03/24/24 1010     WBC 4.12 10*3/mm3      RBC 5.10 10*6/mm3      Hemoglobin 15.0 g/dL      Hematocrit 45.7 %      MCV 89.6 fL      MCH 29.4 pg      MCHC 32.8 g/dL      RDW 12.8 %      RDW-SD 41.9 fl      MPV 9.7 fL      Platelets 136 10*3/mm3     High Sensitivity Troponin T 2Hr [[id number]]  (Normal) Collected: 03/24/24 0105    Specimen: Blood Updated: 03/24/24 0135     HS Troponin T 9 ng/L      Troponin T Delta -1 ng/L     Narrative:      High Sensitive Troponin T Reference Range:  <14.0 ng/L- Negative Female for AMI  <22.0 ng/L- Negative Male for AMI  >=14 - Abnormal Female indicating possible myocardial injury.  >=22 - Abnormal Male indicating possible myocardial injury.   Clinicians would have to utilize clinical acumen, EKG, Troponin, and serial changes to determine if it is an Acute Myocardial Infarction or myocardial injury due to an underlying chronic condition.         [place] Draw [[id number]] Collected: 03/23/24 2202    Specimen: Blood Updated: 03/23/24 2316    Narrative:      The following orders were created for panel order [place] Draw.  Procedure                               Abnormality         Status                     ---------                               -----------         ------                     Green Top (Gel)[[id number]]                                  Final result               Lavender  Top[[id number]]                                     Final result               Gold Top - SST[[id number]]                                   Final result               Light Blue Top[[id number]]                                   Final result                 Please view results for these tests on the individual orders.    Lavender Top [[id number]] Collected: 03/23/24 2202    Specimen: Blood Updated: 03/23/24 2316     Extra Tube hold for add-on     Comment: Auto resulted       Gold Top - SST [[id number]] Collected: 03/23/24 2202    Specimen: Blood Updated: 03/23/24 2316     Extra Tube Hold for add-ons.     Comment: Auto resulted.       Light Blue Top [[id number]] Collected: 03/23/24 2202    Specimen: Blood Updated: 03/23/24 2316     Extra Tube Hold for add-ons.     Comment: Auto resulted       Green Top (Gel) [[id number]] Collected: 03/23/24 2202    Specimen: Blood Updated: 03/23/24 2305    Comprehensive Metabolic Panel [[id number]] Collected: 03/23/24 2202    Specimen: Blood Updated: 03/23/24 2241     Glucose 96 mg/dL      BUN 21 mg/dL      Creatinine 1.04 mg/dL      Sodium 141 mmol/L      Potassium 4.7 mmol/L      Chloride 103 mmol/L      CO2 27.0 mmol/L      Calcium 9.3 mg/dL      Total Protein 7.7 g/dL      Albumin 4.6 g/dL      ALT (SGPT) 31 U/L      AST (SGOT) 26 U/L      Alkaline Phosphatase 117 U/L      Total Bilirubin 0.4 mg/dL      Globulin 3.1 gm/dL      A/G Ratio 1.5 g/dL      BUN/Creatinine Ratio 20.2     Anion Gap 11.0 mmol/L      eGFR 80.2 mL/min/1.73     Narrative:      GFR Normal >60  Chronic Kidney Disease <60  Kidney Failure <15      BNP [[id number]]  (Normal) Collected: 03/23/24 2202    Specimen: Blood Updated: 03/23/24 2241     proBNP <36.0 pg/mL     Narrative:      This assay is used as an aid in the diagnosis of individuals suspected of having heart failure. It can be used as an aid in the diagnosis of acute decompensated heart failure (ADHF) in patients presenting with signs and symptoms of ADHF to the  emergency department (ED). In addition, NT-proBNP of <300 pg/mL indicates ADHF is not likely.    Age Range Result Interpretation  NT-proBNP Concentration (pg/mL:      <50             Positive            >450                   Gray                 300-450                    Negative             <300    50-75           Positive            >900                  Gray                300-900                  Negative            <300      >75             Positive            >1800                  Gray                300-1800                  Negative            <300    High Sensitivity Troponin T [[id number]]  (Normal) Collected: 03/23/24 2202    Specimen: Blood Updated: 03/23/24 2241     HS Troponin T 10 ng/L     Narrative:      High Sensitive Troponin T Reference Range:  <14.0 ng/L- Negative Female for AMI  <22.0 ng/L- Negative Male for AMI  >=14 - Abnormal Female indicating possible myocardial injury.  >=22 - Abnormal Male indicating possible myocardial injury.   Clinicians would have to utilize clinical acumen, EKG, Troponin, and serial changes to determine if it is an Acute Myocardial Infarction or myocardial injury due to an underlying chronic condition.         CBC & Differential [[id number]]  (Abnormal) Collected: 03/23/24 2202    Specimen: Blood Updated: 03/23/24 2216    Narrative:      The following orders were created for panel order CBC & Differential.  Procedure                               Abnormality         Status                     ---------                               -----------         ------                     CBC Auto Differential[[id number]]        Abnormal            Final result                 Please view results for these tests on the individual orders.    CBC Auto Differential [[id number]]  (Abnormal) Collected: 03/23/24 2202    Specimen: Blood Updated: 03/23/24 2216     WBC 4.76 10*3/mm3      RBC 5.49 10*6/mm3      Hemoglobin 15.9 g/dL      Hematocrit 48.9 %      MCV 89.1 fL      MCH 29.0 pg       MCHC 32.5 g/dL      RDW 12.6 %      RDW-SD 41.5 fl      MPV 9.7 fL      Platelets 159 10*3/mm3      Neutrophil % 68.3 %      Lymphocyte % 18.3 %      Monocyte % 10.1 %      Eosinophil % 2.1 %      Basophil % 0.8 %      Immature Grans % 0.4 %      Neutrophils, Absolute 3.25 10*3/mm3      Lymphocytes, Absolute 0.87 10*3/mm3      Monocytes, Absolute 0.48 10*3/mm3      Eosinophils, Absolute 0.10 10*3/mm3      Basophils, Absolute 0.04 10*3/mm3      Immature Grans, Absolute 0.02 10*3/mm3      nRBC 0.0 /100 WBC             Imaging Results (Most Recent)       Procedure Component Value Units Date/Time    XR Chest 1 View [[id number]] Collected: 03/23/24 2238     Updated: 03/23/24 2241    Narrative:      XR CHEST 1 VW    Date of Exam: 3/23/2024 10:23 PM EDT    Indication: CHF/COPD Protocol  CHF/COPD Protocol    Comparison: 3/10/2016.    Findings:  There is no pneumothorax, pleural effusion or focal airspace consolidation. Heart size and pulmonary vasculature appear within normal limits. Regional bones appear intact.      Impression:      Impression:  No acute cardiopulmonary abnormality.        Electronically Signed: Gentry Vanegas MD    3/23/2024 10:38 PM EDT    Workstation ID: [id number]          reviewed    ECG/EMG Results (most recent)       Procedure Component Value Units Date/Time    ECG 12 Lead Dyspnea [[id number]] Collected: 03/23/24 2147     Updated: 03/24/24 0534     QT Interval 309 ms      QTC Interval 395 ms     Narrative:      HEART RATE= 98  bpm  RR Interval= 612  ms  NV Interval= 186  ms  P Horizontal Axis= 23  deg  P Front Axis= 31  deg  QRSD Interval= 98  ms  QT Interval= 309  ms  QTcB= 395  ms  QRS Axis= -70  deg  T Wave Axis= 46  deg  - ABNORMAL ECG -  Sinus rhythm  Left anterior fascicular block  Probable anteroseptal infarct, old  Electronically Signed By:   Date and Time of Study: 2024-03-23 21:47:51    ECG 12 Lead Chest Pain [[id number]] Collected: 03/23/24 2216     Updated: 03/24/24 0701     QT Interval  312 ms      QTC Interval 400 ms     Narrative:      HEART RATE= 98  bpm  RR Interval= 608  ms  MO Interval= 206  ms  P Horizontal Axis= 19  deg  P Front Axis= 38  deg  QRSD Interval= 97  ms  QT Interval= 312  ms  QTcB= 400  ms  QRS Axis= -70  deg  T Wave Axis= 58  deg  - ABNORMAL ECG -  Sinus rhythm  Left anterior fascicular block  Probable anteroseptal infarct, old  No previous ECG available for comparison  Electronically Signed By: Onur Draper (Kehinde) 24-Mar-2024 07:01:05  Date and Time of Study: 2024-03-23 22:16:29          reviewed        Results for orders placed during the hospital encounter of 08/10/23    Adult Transthoracic Echo Complete W/ Cont if Necessary Per Protocol    Interpretation Summary    Estimated right ventricular systolic pressure from tricuspid regurgitation is normal (<35 mmHg).    Conclusion      Normal LV size and contractility EF of  56-60%  Normal RV size  Normal atrial size  Pulmonic valve is not well visualized.  Aortic valve, mitral valve, tricuspid valve appears structurally normal, mild aortic regurgitation seen.  No pericardial effusion seen.  Proximal aorta appears normal in size.      Microbiology Results (last 10 days)       ** No results found for the last 240 hours. **            Assessment & Plan     Chest pain        Chest pain and shortness of breath  Lab Results   Component Value Date    TROPONINT 9 03/24/2024    TROPONINT 10 03/23/2024   -Troponin unremarkable  -BNP less than 36  -CMP and CBC generally unremarkable.  Potassium borderline low at 3.5.  Potassium 40 mEq x 1 given  -Lipid panel unremarkable except HDL 22  -Chest X-ray: No acute cardiopulmonary abnormality  -EKG: Sinus rhythm with heart rate of 98, MO interval 206 and   -In the ED pt given aspirin, ibuprofen, nitroglycerin  -Stress test from 6/15/2023 was low risk for ischemic heart disease and it showed no significant ischemia  -Stress test ordered by ED provider  -Stress Test showed some artifact but no  signs of ischemia.  Compared to previous study the current study reveals no changes  -Telemetry  -Respiratory panel negative  -D-dimer 3.16 but CT chest negative for PE or any infection. Patient might have some underlying lung disease.   -2D echo from 8/10/2023 showed EF 56-60%  -Outpatient referral given to cardiologist and pulmonology.  Wife requests referral to Dr. Church as he is her cardiologist  -Order for outpatient PFT, try to have it done before appointment with pulmonology  -Walk oximetry completed, did not require O2.       Hypertension  -Currently not on any medications  BP Readings from Last 1 Encounters:   03/24/24 132/85   -Was on lisinopril 10 mg but states that he just stopped taking it on his own  -BP was 169/96 upon arrival.  Highest BP of 191/74  -Start low-dose lisinopril  - Monitor while admitted     Pre-Diabetes  -Glucose 114 at this morning  -A1c 5.80    Obesity  -BMI 31.70  -Lifestyle modifications    I discussed the patients findings and my recommendations with patient, family and nursing staff.     Discharge Diagnosis:      Chest pain      Hospital Course  Patient is a 64 y.o. male presented with chest pain as noted in HPI above.  Labs unremarkable including troponin, proBNP, CMP and CBC.  Chest x-ray showed no acute cardiopulmonary abnormality and EKG showed sinus rhythm.  Patient received ibuprofen, and aspirin and nitroglycerin in the ED and was kept overnight in the observation unit for stress test.  Recent stress test from 6/15/2023 was negative for ischemia.  Current stress test showed no changes from previous study.  No signs of ischemia but some artifact noted.  Patient's BP was noted to be elevated with highest reading at 191/74 but systolic BP ranging 130s to 140s.  He has been on lisinopril in the past but he stopped taking 8 months ago on his own.  He was a restarted on lisinopril 10 mg daily and was given referral to follow-up with cardiology outpatient and pulmonology.  He  reported some shortness of breath, D-dimer elevated but CT chest negative for PE. Respiratory panel negative. Walk oximetry completed, did not require O2.  At this time, patient felt to be in good condition for discharge with close follow up with PCP, cardiology and pulmonology. Outpatient PFT ordered. Instructed to take lisinopril as prescribed and to return to ED if any concerning signs/symptoms. All test/lab results were discussed with patient. All questions were answered and patient verbalizes understanding.       Past Medical History:     Past Medical History:   Diagnosis Date    Hypertension        Past Surgical History:   History reviewed. No pertinent surgical history.    Social History:   Social History     Socioeconomic History    Marital status:    Tobacco Use    Smoking status: Former     Current packs/day: 0.00     Average packs/day: 1 pack/day for 20.0 years (20.0 ttl pk-yrs)     Types: Cigarettes     Start date:      Quit date:      Years since quittin.2    Smokeless tobacco: Never   Vaping Use    Vaping status: Never Used   Substance and Sexual Activity    Alcohol use: Never    Drug use: Never    Sexual activity: Defer       Procedures Performed         Consults:   Consults       No orders found for last 30 day(s).            Condition on Discharge:     Stable    Discharge Disposition      Discharge Medications     Discharge Medications        Continue These Medications        Instructions Start Date   ibuprofen 800 MG tablet  Commonly known as: ADVIL,MOTRIN   800 mg, Oral, Every 6 Hours PRN               Discharge Diet:     Activity at Discharge:     Follow-up Appointments  Future Appointments   Date Time Provider Department Center   3/26/2024  2:30 PM Mago Tai APRN [facility]         Test Results Pending at Discharge       Risk for Readmission (LACE) Score: 1 (3/24/2024  6:00 AM)      Greater than 30 minutes spent in discharge activities for this  patient    Signature:Electronically signed by ZEINA Harris, 03/24/24, 10:39 AM EDT.            NAUSEA/TENDERNESS/PAIN

## 2024-03-24 NOTE — ED PROVIDER NOTES
Subjective   History of Present Illness  Patient is a 64-year-old gentleman who does not take any home medications he comes in today with chest pain that he states is midsternal and radiates into the left arm he states it waxes and wanes in intensity it started yesterday and got worse today.  He states he had a stress test about a year ago and it was negative that he has never seen a cardiologist.      Review of Systems   Constitutional:  Negative for chills, fatigue and fever.   HENT:  Negative for congestion, tinnitus and trouble swallowing.    Eyes:  Negative for photophobia, discharge and redness.   Respiratory:  Positive for chest tightness. Negative for cough and shortness of breath.    Cardiovascular:  Positive for chest pain. Negative for palpitations.   Gastrointestinal:  Negative for abdominal pain, diarrhea, nausea and vomiting.   Genitourinary:  Negative for dysuria, frequency and urgency.   Musculoskeletal:  Negative for back pain, joint swelling and myalgias.   Skin:  Negative for rash.   Neurological:  Negative for dizziness and headaches.   Psychiatric/Behavioral:  Negative for confusion.    All other systems reviewed and are negative.      Past Medical History:   Diagnosis Date    Hypertension        No Known Allergies    History reviewed. No pertinent surgical history.    Family History   Problem Relation Age of Onset    Lung cancer Mother     Heart disease Mother        Social History     Socioeconomic History    Marital status:    Tobacco Use    Smoking status: Former     Current packs/day: 0.00     Average packs/day: 1 pack/day for 20.0 years (20.0 ttl pk-yrs)     Types: Cigarettes     Start date:      Quit date: 2008     Years since quittin.2    Smokeless tobacco: Never   Vaping Use    Vaping status: Never Used   Substance and Sexual Activity    Alcohol use: Never    Drug use: Never    Sexual activity: Defer           Objective   Physical Exam  Vitals reviewed.   Constitutional:        Appearance: He is well-developed.   HENT:      Head: Normocephalic and atraumatic.   Eyes:      Conjunctiva/sclera: Conjunctivae normal.      Pupils: Pupils are equal, round, and reactive to light.   Cardiovascular:      Rate and Rhythm: Normal rate and regular rhythm.      Pulses:           Carotid pulses are 2+ on the right side and 2+ on the left side.       Radial pulses are 2+ on the right side and 2+ on the left side.        Dorsalis pedis pulses are 2+ on the right side and 2+ on the left side.        Posterior tibial pulses are 2+ on the right side and 2+ on the left side.      Heart sounds: Normal heart sounds.   Pulmonary:      Effort: Pulmonary effort is normal.      Breath sounds: Normal breath sounds. No decreased breath sounds.   Abdominal:      General: Bowel sounds are normal.      Palpations: Abdomen is soft.   Musculoskeletal:         General: Normal range of motion.      Cervical back: Normal range of motion and neck supple.      Right lower leg: No tenderness. No edema.      Left lower leg: No tenderness. No edema.   Skin:     General: Skin is warm and dry.      Capillary Refill: Capillary refill takes less than 2 seconds.   Neurological:      General: No focal deficit present.      Mental Status: He is alert and oriented to person, place, and time.      GCS: GCS eye subscore is 4. GCS verbal subscore is 5. GCS motor subscore is 6.   Psychiatric:         Mood and Affect: Mood normal.         Behavior: Behavior normal.         Procedures             EKG was interpreted by myself as well as Dr. Woodward as sinus rhythm with a left anterior fascicular block     ED Course                HEART Score: 3                              Medical Decision Making  Stress test from 6/23/2023  Interpretation Summary       ·  Findings consistent with a normal ECG stress test.  ·  Left ventricular ejection fraction is normal (Calculated EF = 63%).  ·  Low risk for ischemic heart disease.  ·  Myocardial  perfusion imaging indicates a small-sized infarct located in the basal inferior wall with no significant ischemia noted.  ·  Impressions are consistent with a low risk study.    Clinical Indication for echocardiogram done in June 2023    Other Reasons - dizziness; Lightheadedness, Presyncope, or Syncope  Dx: Postural dizziness [R42 (ICD-10-CM)]  Comments: Recent stress test  normal but did indicate basal inferior wall infarction.      Echocardiogram results from 2023/June  Interpretation Summary       ·  Estimated right ventricular systolic pressure from tricuspid regurgitation is normal (<35 mmHg).                                                                                       Conclusion        Normal LV size and contractility EF of  56-60%  Normal RV size  Normal atrial size  Pulmonic valve is not well visualized.  Aortic valve, mitral valve, tricuspid valve appears structurally normal, mild aortic regurgitation seen.  No pericardial effusion seen.  Proximal aorta appears normal in size.          Today the patient presented with chest pain and had an EKG which showed no acute changes-patient was found to have a normal CBC chemistry troponin and BNP he did have some elevated blood pressure which she does not take medications at home for.  He was placed on Tridil paste and had significant improvement of his blood pressure but then did develop a headache and was treated with some Motrin here in the emergency room.  He will be placed in ED observation for chest pain rule out and stress Myoview in the morning-patient was agreeable this plan of care was made n.p.o. after midnight and stable when placed in ED observation    Problems Addressed:  Chest pain, unspecified type: complicated acute illness or injury    Amount and/or Complexity of Data Reviewed  Independent Historian: spouse     Details: Wife at bedside  External Data Reviewed: labs, radiology, ECG and notes.  Labs: ordered. Decision-making details  documented in ED Course.  Radiology: ordered and independent interpretation performed. Decision-making details documented in ED Course.  ECG/medicine tests: ordered and independent interpretation performed. Decision-making details documented in ED Course.    Risk  OTC drugs.  Prescription drug management.  Decision regarding hospitalization.        Final diagnoses:   Chest pain, unspecified type       ED Disposition  ED Disposition       ED Disposition   Decision to Admit    Condition   --    Comment   --               No follow-up provider specified.       Medication List      No changes were made to your prescriptions during this visit.            Ann Cast, APRN  03/23/24 [id number]

## 2024-03-24 NOTE — PROCEDURES
Exercise Oximetry    Patient Name:Francis Dunlap   MRN: [id number]   Date: 03/24/24             ROOM AIR BASELINE   SpO2% 92   Heart Rate 71   Blood Pressure      EXERCISE ON ROOM AIR SpO2% EXERCISE ON O2 @  LPM SpO2%   1 MINUTE 92 1 MINUTE    2 MINUTES 93 2 MINUTES    3 MINUTES 94 3 MINUTES    4 MINUTES 94 4 MINUTES    5 MINUTES 94 5 MINUTES    6 MINUTES 94 6 MINUTES               Distance Walked  425 Distance Walked   Dyspnea (Ya Scale)  2 Dyspnea (Ya Scale)   Fatigue (Ya Scale)  9 Fatigue (Ya Scale)   SpO2% Post Exercise  94 SpO2% Post Exercise   HR Post Exercise  77 HR Post Exercise   Time to Recovery  1minute Time to Recovery     Comments: Patient does not need or qualify for home oxygen.

## 2024-03-24 NOTE — SIGNIFICANT NOTE
Case Management Discharge Note      Final Note: (P) d/c home         Selected Continued Care - Discharged on 3/24/2024 Admission date: 3/23/2024 - Discharge disposition: Home or Self Care         Final Discharge Disposition Code: (P) 01 - home or self-care

## 2024-03-24 NOTE — PLAN OF CARE
Problem: Adult Inpatient Plan of Care  Goal: Plan of Care Review  Outcome: Ongoing, Progressing  Flowsheets (Taken 3/24/2024 0052)  Progress: no change  Plan of Care Reviewed With:   patient   spouse  Outcome Evaluation: Upon admission pt states his  pain is 2/10.  nitro paste in place. pt rested, awaiting stress test today. will cont to monitor.   Goal Outcome Evaluation:

## 2024-03-24 NOTE — OUTREACH NOTE
Prep Survey      Flowsheet Row Responses   [facility] discharged from? [facility]   Is LACE score < 7 ? Yes   Eligibility [facility]   Date of Admission 03/23/24   Date of Discharge 03/24/24   Discharge Disposition Home or Self Care   Discharge diagnosis chest pain   Does the patient have one of the following disease processes/diagnoses(primary or secondary)? Other   Does the patient have Home health ordered? No   Is there a DME ordered? No   Prep survey completed? Yes            Malka HOANG - Registered Nurse

## 2024-03-25 ENCOUNTER — TRANSITIONAL CARE MANAGEMENT TELEPHONE ENCOUNTER (OUTPATIENT)
Dept: CALL CENTER | Facility: HOSPITAL | Age: 64
End: 2024-03-25
Payer: COMMERCIAL

## 2024-03-25 NOTE — PROGRESS NOTES
Office Note     Name: Francis Dunlap    : 1960     MRN: [id number]     Chief Complaint  Transitional Care Management    Subjective     History of Present Illness:  Francis Dunlap is a 64 y.o. male who presents today for hospital discharge follow up.     He reports having chest pressure with moving furniture in the house. He reports his legs went limp, felt like noodles and he went down. He has been having a headache since that time.  He is having shortness of breath, coughing (occasional grunting noted). He reports being incontinent of stool, bloody noses, blood pressure remaining elevated.  He is having cold sweats any time during the day and his wife reports that he is not hold.        History of Present Illness  Francis went [facility] 24 for chest pain. He was admitted and stress test was done and CT scan that was normal. Would like referral to pulmonary and PFT         Transitional Care Management Certification  I certify that the following are true:  Communication was made within 2 business days of discharge.  Complexity of Medical Decision Making is moderate.  Face to face visit occurred within 3 days.    *Note: 44626 is for high complexity patients with a face to face visit within 7 days of discharge.  33508 is for high complexity patients with a face to face on days 8-14 post discharge or medium complexity with face to face visit within 14 days post discharge.      Francis was seen at [facility] on  and admitted for 1 night stay due to chest pain.  EKG echocardiogram blood work all completed.  Stress test completed with he is here today for follow-up.  He has been referred to Dr. Church, cardiology and has been requested to have pulmonary function studies completed.      CT Angiogram completed 3/24/2024.  No pulmonary embolus or acute findings noted.  He does have a slight amount of atherosclerosis in the thoracic aorta.    No chest pain  No abdominal pain  Occasional nausea with  eating.   Frequent coughing.     Plan: Answers submitted by the patient for this visit:  Primary Reason for Visit (Submitted on 3/23/2024)  What is the primary reason for your visit?: Other  Other (Submitted on 3/23/2024)  Please describe your symptoms.: legs went limp they were like noodles uncontrollable bowl moment fever headaches night sweets .i have had the leg symptoms before  Have you had these symptoms before?: No  How long have you been having these symptoms?: 1-4 days  Please list any medications you are currently taking for this condition.: none  Please describe any probable cause for these symptoms. : unknown      Follow up with Dr. Church.     PFT and Pulmonology.     Holter monitor as discussed.  Check blood pressure.  Goal 120/80.   Limit sodium in diet.     U/S of thyroid for dysphagia.     Gastroenterology referral for dysphagia and incontinence.     Omeprazole for suspected GERD    Change lisinopril to Losartan.      No Known Allergies      Current Outpatient Medications:   •  ibuprofen (ADVIL,MOTRIN) 800 MG tablet, Take 1 tablet by mouth Every 6 (Six) Hours As Needed for Mild Pain., Disp: , Rfl:   •  albuterol sulfate  (90 Base) MCG/ACT inhaler, Inhale 2 puffs Every 4 (Four) Hours As Needed for Wheezing., Disp: 6.7 g, Rfl: 0  •  Fluticasone-Salmeterol (ADVAIR/WIXELA) 100-50 MCG/ACT DISKUS, Inhale 1 puff 2 (Two) Times a Day., Disp: 30 each, Rfl: 12  •  losartan (COZAAR) 50 MG tablet, Take 1 tablet by mouth Daily., Disp: 90 tablet, Rfl: 0  •  omeprazole (priLOSEC) 20 MG capsule, Take 1 capsule by mouth Daily., Disp: 90 capsule, Rfl: 0    Past Medical History:   Diagnosis Date   • Hypertension        Review of Systems    Social History     Socioeconomic History   • Marital status:    Tobacco Use   • Smoking status: Former     Current packs/day: 0.00     Average packs/day: 1 pack/day for 20.0 years (20.0 ttl pk-yrs)     Types: Cigarettes     Start date: 1988     Quit date: 2008     Years  "since quittin.2   • Smokeless tobacco: Never   Vaping Use   • Vaping status: Never Used   Substance and Sexual Activity   • Alcohol use: Never   • Drug use: Never   • Sexual activity: Defer       Family History   Problem Relation Age of Onset   • Lung cancer Mother    • Heart disease Mother            2023    11:04 AM   PHQ-2/PHQ-9 Depression Screening   Little Interest or Pleasure in Doing Things 0-->not at all   Feeling Down, Depressed or Hopeless 0-->not at all   PHQ-9: Brief Depression Severity Measure Score 0       Fall Risk Screen:  NORY Fall Risk Assessment has not been completed.      Objective     /75 (BP Location: Left arm, Patient Position: Sitting, Cuff Size: Adult)   Pulse 74   Temp 97.3 °F (36.3 °C) (Infrared)   Resp 16   Ht 177.8 cm (70\")   Wt 102 kg (224 lb)   SpO2 95%   BMI 32.14 kg/m²     BP Readings from Last 2 Encounters:   24 152/75   24 132/85       Wt Readings from Last 2 Encounters:   24 102 kg (224 lb)   24 100 kg (220 lb 14.4 oz)       BMI is >= 30 and <35. (Class 1 Obesity). The following options were offered after discussion;: Information on healthy weight added to patient's after visit summary.         Physical Exam  Derm Physical Exam  Result Review     The following data was reviewed by: ZEINA Lan on 2024:  Common labs          2023    10:55 3/23/2024    22:02 3/24/2024    09:49   Common Labs   Glucose 102  96  114    BUN 17  21  21    Creatinine 0.98  1.04  0.89    Sodium 140  141  140    Potassium 5.1  4.7  3.5    Chloride 105  103  105    Calcium 9.7  9.3  8.8    Total Protein 6.9      Albumin 4.2  4.6     Total Bilirubin 0.3  0.4     Alkaline Phosphatase 122  117     AST (SGOT) 20  26     ALT (SGPT) 22  31     WBC 6.1  4.76  4.12    Hemoglobin 16.1  15.9  15.0    Hematocrit 46.5  48.9  45.7    Platelets 213  159  136    Total Cholesterol   133    Total Cholesterol 144      Triglycerides 52   88    HDL " Cholesterol 36   22    LDL Cholesterol  97   94    Hemoglobin A1C   5.80    PSA 0.4        CT Angiogram Chest Pulmonary Embolism    Result Date: 3/24/2024  Impression: Impression: No pulmonary embolus or other acute finding in the chest. Electronically Signed: Cristian López MD  3/24/2024 1:35 PM EDT  Workstation ID: [id number]    XR Chest 1 View    Result Date: 3/23/2024  Impression: Impression: No acute cardiopulmonary abnormality. Electronically Signed: Gentry Vanegas MD  3/23/2024 10:38 PM EDT  Workstation ID: [id number]   Data reviewed : Recent hospitalization notes [facility] admit 3/24/24.       Assessment and Plan     Procedures  Plan   Diagnoses and all orders for this visit:    1. Hospital discharge follow-up (Primary)  -     POC Urinalysis Dipstick    2. Shortness of breath  -     Holter Monitor - 72 Hour Up To 15 Days  -     Fluticasone-Salmeterol (ADVAIR/WIXELA) 100-50 MCG/ACT DISKUS; Inhale 1 puff 2 (Two) Times a Day.  Dispense: 30 each; Refill: 12  -     albuterol sulfate  (90 Base) MCG/ACT inhaler; Inhale 2 puffs Every 4 (Four) Hours As Needed for Wheezing.  Dispense: 6.7 g; Refill: 0    3. Essential hypertension  -     losartan (COZAAR) 50 MG tablet; Take 1 tablet by mouth Daily.  Dispense: 90 tablet; Refill: 0    4. Cough in adult  -     omeprazole (priLOSEC) 20 MG capsule; Take 1 capsule by mouth Daily.  Dispense: 90 capsule; Refill: 0    5. Incontinence of feces, unspecified fecal incontinence type  -     Ambulatory Referral to Gastroenterology    6. Dysphagia, unspecified type  -     Ambulatory Referral to Gastroenterology  -      Thyroid    7. Hypertension, unspecified type  Comments:  b/p 157 at home. Occasionally 190's.   Taking Lisinopril 10 mg    8. Atherosclerosis of aorta  Comments:  Noted on CT Angiogram    9. History of tobacco abuse  Comments:  40 year history of smoking 1 ppd   Quit in 2007.    10. Dizziness  -     Holter Monitor - 72 Hour Up To 15 Days    11. Night  sweats    12. Thrombocytopenia  -     Peripheral Blood Smear  -     Comprehensive metabolic panel; Future  -     TSH  -     T4, free; Future  -     Thyroid Antibodies  -     Vitamin D 25 hydroxy; Future        Problem List Items Addressed This Visit          Cardiac and Vasculature    Essential hypertension    Relevant Medications    losartan (COZAAR) 50 MG tablet     Other Visit Diagnoses       Hospital discharge follow-up    -  Primary    Relevant Orders    POC Urinalysis Dipstick (Completed)    Shortness of breath        Relevant Medications    Fluticasone-Salmeterol (ADVAIR/WIXELA) 100-50 MCG/ACT DISKUS    albuterol sulfate  (90 Base) MCG/ACT inhaler    Other Relevant Orders    Holter Monitor - 72 Hour Up To 15 Days    Cough in adult        Relevant Medications    omeprazole (priLOSEC) 20 MG capsule    Incontinence of feces, unspecified fecal incontinence type        Relevant Orders    Ambulatory Referral to Gastroenterology    Dysphagia, unspecified type        Relevant Orders    Ambulatory Referral to Gastroenterology    US Thyroid    Hypertension, unspecified type        b/p 157 at home. Occasionally 190's.   Taking Lisinopril 10 mg    Relevant Medications    losartan (COZAAR) 50 MG tablet    Atherosclerosis of aorta        Noted on CT Angiogram    History of tobacco abuse        40 year history of smoking 1 ppd   Quit in 2007.    Dizziness        Relevant Orders    Holter Monitor - 72 Hour Up To 15 Days    Night sweats        Thrombocytopenia        Relevant Orders    Peripheral Blood Smear    Comprehensive metabolic panel    TSH    T4, free    Thyroid Antibodies    Vitamin D 25 hydroxy             Follow Up   Wrapup Tab  Return in about 4 weeks (around 4/23/2024) for Recheck.   Patient Instructions   Follow up with Dr. Church.     PFT and Pulmonology.     Holter monitor as discussed.  Check blood pressure.  Goal 120/80.   Limit sodium in diet.     U/S of thyroid for dysphagia.     Gastroenterology  referral for dysphagia and incontinence.     Omeprazole for suspected GERD    Change lisinopril to Losartan.     Patient was given instructions and counseling regarding his condition or for health maintenance advice. Please see specific information pulled into the AVS if appropriate.  Hand hygiene was performed during entrance to exam room and following assessment of patient. This document is intended for medical expert use only.     EMR Dragon/Transcription disclaimer:   Much of this encounter note is an electronic transcription/translation of spoken language to printed text. The electronic translation of spoken language may permit erroneous, or at times, nonsensical words or phrases to be inadvertently transcribed.      ZEINA Lan, FNP-C  [address]  [facility]  [address] [place] [address] [address]  [place] [address] [phone]

## 2024-03-25 NOTE — OUTREACH NOTE
Call Center TCM Note      Flowsheet Row Responses   [facility] facility patient discharged from? [facility]   Does the patient have one of the following disease processes/diagnoses(primary or secondary)? Other   TCM attempt successful? No   Unsuccessful attempts Attempt 1            Jeana Dixon MA    3/25/2024, 15:02 EDT

## 2024-03-25 NOTE — OUTREACH NOTE
Call Center TCM Note      Flowsheet Row Responses   [facility] patient discharged from? [facility]   Does the patient have one of the following disease processes/diagnoses(primary or secondary)? Other   TCM attempt successful? Yes   Call start time 1623   Call end time 1627   Discharge diagnosis chest pain   Is patient permission given to speak with other caregiver? Yes   Meds reviewed with patient/caregiver? Yes   Is the patient having any side effects they believe may be caused by any medication additions or changes? No   Does the patient have all medications ordered at discharge? Yes   Is the patient taking all medications as directed (includes completed medication regime)? Yes   Does the patient have an appointment with their PCP within 7-14 days of discharge? Yes   Has home health visited the patient within 72 hours of discharge? N/A   Psychosocial issues? No   Did the patient receive a copy of their discharge instructions? Yes   Nursing interventions Reviewed instructions with patient   What is the patient's perception of their health status since discharge? Same   Is the patient/caregiver able to teach back signs and symptoms related to disease process for when to call PCP? Yes   Is the patient/caregiver able to teach back signs and symptoms related to disease process for when to call 911? Yes   Is the patient/caregiver able to teach back the hierarchy of who to call/visit for symptoms/problems? PCP, Specialist, Home health nurse, Urgent Care, ED, 911 Yes   If the patient is a current smoker, are they able to teach back resources for cessation? Not a smoker   TCM call completed? Yes   Wrap up additional comments Per wife pt BP is down a bit, New Lisinopril in place. Pt gets winded very easily, not unlike during admit. Pt does have Pulse Ox but he has been in bed most of day and wife has not checked his sats. Fortunately pt sees PCP Mago Tai tomorrow.   Call end time 1627            Jeana Dixon,  MA    3/25/2024, 16:28 EDT

## 2024-03-26 ENCOUNTER — TELEPHONE (OUTPATIENT)
Dept: FAMILY MEDICINE CLINIC | Facility: CLINIC | Age: 64
End: 2024-03-26

## 2024-03-26 ENCOUNTER — OFFICE VISIT (OUTPATIENT)
Dept: FAMILY MEDICINE CLINIC | Facility: CLINIC | Age: 64
End: 2024-03-26
Payer: COMMERCIAL

## 2024-03-26 VITALS
WEIGHT: 224 LBS | BODY MASS INDEX: 32.07 KG/M2 | TEMPERATURE: 97.3 F | HEART RATE: 74 BPM | DIASTOLIC BLOOD PRESSURE: 75 MMHG | RESPIRATION RATE: 16 BRPM | SYSTOLIC BLOOD PRESSURE: 152 MMHG | OXYGEN SATURATION: 95 % | HEIGHT: 70 IN

## 2024-03-26 DIAGNOSIS — R06.02 SHORTNESS OF BREATH: ICD-10-CM

## 2024-03-26 DIAGNOSIS — I10 HYPERTENSION, UNSPECIFIED TYPE: ICD-10-CM

## 2024-03-26 DIAGNOSIS — D69.6 THROMBOCYTOPENIA: ICD-10-CM

## 2024-03-26 DIAGNOSIS — R15.9 INCONTINENCE OF FECES, UNSPECIFIED FECAL INCONTINENCE TYPE: ICD-10-CM

## 2024-03-26 DIAGNOSIS — R61 NIGHT SWEATS: ICD-10-CM

## 2024-03-26 DIAGNOSIS — Z09 HOSPITAL DISCHARGE FOLLOW-UP: Primary | ICD-10-CM

## 2024-03-26 DIAGNOSIS — I10 ESSENTIAL HYPERTENSION: ICD-10-CM

## 2024-03-26 DIAGNOSIS — Z87.891 HISTORY OF TOBACCO ABUSE: ICD-10-CM

## 2024-03-26 DIAGNOSIS — R42 DIZZINESS: ICD-10-CM

## 2024-03-26 DIAGNOSIS — R13.10 DYSPHAGIA, UNSPECIFIED TYPE: ICD-10-CM

## 2024-03-26 DIAGNOSIS — R05.9 COUGH IN ADULT: ICD-10-CM

## 2024-03-26 DIAGNOSIS — I70.0 ATHEROSCLEROSIS OF AORTA: ICD-10-CM

## 2024-03-26 LAB
BILIRUB BLD-MCNC: NEGATIVE MG/DL
CLARITY, POC: CLEAR
COLOR UR: YELLOW
GLUCOSE UR STRIP-MCNC: NEGATIVE MG/DL
KETONES UR QL: NEGATIVE
LEUKOCYTE EST, POC: NEGATIVE
NITRITE UR-MCNC: NEGATIVE MG/ML
PH UR: 7 [PH] (ref 5–8)
PROT UR STRIP-MCNC: NEGATIVE MG/DL
RBC # UR STRIP: NEGATIVE /UL
SP GR UR: 1.02 (ref 1–1.03)
UROBILINOGEN UR QL: NORMAL

## 2024-03-26 RX ORDER — ALBUTEROL SULFATE 90 UG/1
2 AEROSOL, METERED RESPIRATORY (INHALATION) EVERY 4 HOURS PRN
Qty: 6.7 G | Refills: 0 | Status: SHIPPED | OUTPATIENT
Start: 2024-03-26

## 2024-03-26 RX ORDER — FLUTICASONE PROPIONATE AND SALMETEROL 100; 50 UG/1; UG/1
1 POWDER RESPIRATORY (INHALATION)
Qty: 30 EACH | Refills: 12 | Status: SHIPPED | OUTPATIENT
Start: 2024-03-26

## 2024-03-26 RX ORDER — OMEPRAZOLE 20 MG/1
20 CAPSULE, DELAYED RELEASE ORAL DAILY
Qty: 90 CAPSULE | Refills: 0 | Status: SHIPPED | OUTPATIENT
Start: 2024-03-26

## 2024-03-26 RX ORDER — LOSARTAN POTASSIUM 50 MG/1
50 TABLET ORAL DAILY
Qty: 90 TABLET | Refills: 0 | Status: SHIPPED | OUTPATIENT
Start: 2024-03-26

## 2024-03-26 NOTE — PATIENT INSTRUCTIONS
Follow up with Dr. Church.     PFT and Pulmonology.     Holter monitor as discussed.  Check blood pressure.  Goal 120/80.   Limit sodium in diet.     U/S of thyroid for dysphagia.     Gastroenterology referral for dysphagia and incontinence.     Omeprazole for suspected GERD    Change lisinopril to Losartan.

## 2024-03-26 NOTE — TELEPHONE ENCOUNTER
Patient is aware we are not in par with the insurance and is willing to pay the out of pocket cost

## 2024-04-04 ENCOUNTER — OFFICE VISIT (OUTPATIENT)
Dept: PULMONOLOGY | Facility: HOSPITAL | Age: 64
End: 2024-04-04
Payer: COMMERCIAL

## 2024-04-04 VITALS
HEIGHT: 70 IN | DIASTOLIC BLOOD PRESSURE: 68 MMHG | WEIGHT: 224 LBS | SYSTOLIC BLOOD PRESSURE: 120 MMHG | BODY MASS INDEX: 32.07 KG/M2 | HEART RATE: 72 BPM | OXYGEN SATURATION: 95 % | RESPIRATION RATE: 16 BRPM

## 2024-04-04 DIAGNOSIS — G47.10 HYPERSOMNOLENCE: ICD-10-CM

## 2024-04-04 DIAGNOSIS — I10 ESSENTIAL HYPERTENSION: ICD-10-CM

## 2024-04-04 DIAGNOSIS — Z87.891 FORMER SMOKER: ICD-10-CM

## 2024-04-04 DIAGNOSIS — R07.9 CHEST PAIN, UNSPECIFIED TYPE: ICD-10-CM

## 2024-04-04 DIAGNOSIS — R06.02 SHORTNESS OF BREATH ON EXERTION: Primary | ICD-10-CM

## 2024-04-04 DIAGNOSIS — R06.83 SNORING: ICD-10-CM

## 2024-04-04 PROCEDURE — G0463 HOSPITAL OUTPT CLINIC VISIT: HCPCS

## 2024-04-04 NOTE — PROGRESS NOTES
HPI:  New patient evaluation   for the last 6-month has been having  dyspnea on exertion, recurrent cough to clear up his mucus.  To have loud snoring  Also, interrupted sleep and excessive daytime sleepiness  He is a retired  and reports some asbestos exposure for less than a year in his younger years    Past Medical History:   Diagnosis Date    Hypertension         Current Outpatient Medications on File Prior to Visit   Medication Sig Dispense Refill    albuterol sulfate  (90 Base) MCG/ACT inhaler Inhale 2 puffs Every 4 (Four) Hours As Needed for Wheezing. 6.7 g 0    Fluticasone-Salmeterol (ADVAIR/WIXELA) 100-50 MCG/ACT DISKUS Inhale 1 puff 2 (Two) Times a Day. 30 each 12    losartan (COZAAR) 50 MG tablet Take 1 tablet by mouth Daily. 90 tablet 0    omeprazole (priLOSEC) 20 MG capsule Take 1 capsule by mouth Daily. 90 capsule 0    [DISCONTINUED] ibuprofen (ADVIL,MOTRIN) 800 MG tablet Take 1 tablet by mouth Every 6 (Six) Hours As Needed for Mild Pain.       No current facility-administered medications on file prior to visit.        Social History     Tobacco Use    Smoking status: Former     Current packs/day: 0.00     Average packs/day: 1 pack/day for 20.0 years (20.0 ttl pk-yrs)     Types: Cigarettes     Start date:      Quit date:      Years since quittin.2     Passive exposure: Past    Smokeless tobacco: Never   Vaping Use    Vaping status: Never Used   Substance Use Topics    Alcohol use: Never    Drug use: Never        Family History   Problem Relation Age of Onset    Lung cancer Mother     Heart disease Mother     No Known Problems Father         Review of system:  Constitutional: Negative for chills, fever and positive for snoring, interrupted sleep and malaise/fatigue.   HENT: Negative.    Eyes: Negative.    Cardiovascular: Negative.    Respiratory: Positive for cough and shortness of breath.    Skin: Negative.    Musculoskeletal: Negative.    Gastrointestinal:  "Negative.    Genitourinary: Negative.    Neurological: Negative.    Psychiatric/Behavioral: Negative.    Physical exam:  Blood pressure 120/68, pulse 72, resp. rate 16, height 177.8 cm (70\"), weight 102 kg (224 lb), SpO2 95%.    General Appearance:  Alert   HEENT:  Normocephalic, without obvious abnormality, Conjunctiva/corneas clear,.   Nares normal, no drainage     Neck:  Supple, symmetrical, trachea midline. No JVD.  Lungs /Chest wall:   good air entry Bilaterlly, respirations unlabored, symmetrical wall movement.     Heart:  Regular rate and rhythm, S1 S2 normal  Abdomen: Soft, non-tender, no masses, no organomegaly.    Extremities: No edema, no clubbing or cyanosis    CT Angiogram Chest Pulmonary Embolism    Result Date: 3/24/2024  Impression: No pulmonary embolus or other acute finding in the chest. Electronically Signed: Cristian López MD  3/24/2024 1:35 PM EDT  Workstation ID: [id number]    XR Chest 1 View    Result Date: 3/23/2024  Impression: No acute cardiopulmonary abnormality. Electronically Signed: Gentry Vanegas MD  3/23/2024 10:38 PM EDT  Workstation ID: [id number]    Results for orders placed during the hospital encounter of 08/10/23    Adult Transthoracic Echo Complete W/ Cont if Necessary Per Protocol    Interpretation Summary    Estimated right ventricular systolic pressure from tricuspid regurgitation is normal (<35 mmHg).    Conclusion      Normal LV size and contractility EF of  56-60%  Normal RV size  Normal atrial size  Pulmonic valve is not well visualized.  Aortic valve, mitral valve, tricuspid valve appears structurally normal, mild aortic regurgitation seen.  No pericardial effusion seen.  Proximal aorta appears normal in size.        Assessment and plan:  Shortness of breath on exertion: 6-minute walk March 2024 within normal limits  Previous smoker, quit 2007 after 20 pack years  Snoring, interrupted sleep and excessive daytime sleepiness, possible PAOLO  History of chest pain, abnormal " stress test March 2024  Hypertension  GERD    Plan:  PFTs  Home sleep test  Incruse inhaler daily  Albuterol inhaler as needed  Keep follow-up with cardiology for further evaluation about the chest pain  Losartan, Prilosec    I personally reviewed the latest radiological study  Patient is advised to stay up-to-date on immunizations but he declined

## 2024-04-11 NOTE — PROGRESS NOTES
Date of Office Visit: 2024  Encounter Provider: Dr. Manolo Church  Place of Service: [facility] CARDIOLOG[facility] [place]  Patient Name: Francis Dunlap  :1960  Mago Tai APRN    Chief Complaint   Patient presents with    Hypertension    Consult    Chest Pain     History of Present Illness:    I am pleased to see Mr. Dunlap in my office today as a new consultation.    As you know, patient is 64-year-old white gentleman whose past medical history is significant for asthma, hypertension, who is referred to me for symptom of chest discomfort.  Patient came today for recent hospital admission and discharge    In 2024, patient was admitted in the hospital with symptom of chest pain and shortness of breath.  Patient ruled out for myocardial infarction.  Patient underwent stress test which was negative for ischemia.  Patient had possible fixed inferior defect was noted.  Patient underwent echocardiogram which showed preserved left ventricular function and no significant valvular heart disease noted EF was 55 to 60%    Since the discharge from the hospital patient does have shortness of breath.  No significant chest pain.  Patient denies any orthopnea, PND, syncope or presyncope.  No leg edema noted.    Patient had recent cardiac workup which was unremarkable.  Patient had chest pain but at present is asymptomatic I will continue current treatment.  Patient blood pressure is controlled current therapy would be continued patient is advised to monitor the blood pressure and bring the logbook.  Consider CT calcium scoring in future            Past Medical History:   Diagnosis Date    Hypertension          History reviewed. No pertinent surgical history.        Current Outpatient Medications:     albuterol sulfate  (90 Base) MCG/ACT inhaler, Inhale 2 puffs Every 4 (Four) Hours As Needed for Wheezing., Disp: 6.7 g, Rfl: 0    Fluticasone-Salmeterol (ADVAIR/WIXELA) 100-50 MCG/ACT DISKUS, Inhale 1  "puff 2 (Two) Times a Day., Disp: 30 each, Rfl: 12    losartan (COZAAR) 50 MG tablet, Take 1 tablet by mouth Daily., Disp: 90 tablet, Rfl: 0    omeprazole (priLOSEC) 20 MG capsule, Take 1 capsule by mouth Daily., Disp: 90 capsule, Rfl: 0      Social History     Socioeconomic History    Marital status:    Tobacco Use    Smoking status: Former     Current packs/day: 0.00     Average packs/day: 1 pack/day for 20.0 years (20.0 ttl pk-yrs)     Types: Cigarettes     Start date:      Quit date:      Years since quittin.2     Passive exposure: Past    Smokeless tobacco: Never   Vaping Use    Vaping status: Never Used   Substance and Sexual Activity    Alcohol use: Never    Drug use: Never    Sexual activity: Defer         Review of Systems   Constitutional: Negative for chills and fever.   HENT:  Negative for ear discharge and nosebleeds.    Eyes:  Negative for discharge and redness.   Cardiovascular:  Positive for chest pain. Negative for orthopnea, palpitations, paroxysmal nocturnal dyspnea and syncope.   Respiratory:  Positive for shortness of breath. Negative for cough and wheezing.    Endocrine: Negative for heat intolerance.   Skin:  Negative for rash.   Musculoskeletal:  Negative for arthritis and myalgias.   Gastrointestinal:  Negative for abdominal pain, melena, nausea and vomiting.   Genitourinary:  Negative for dysuria and hematuria.   Neurological:  Negative for dizziness, light-headedness, numbness and tremors.   Psychiatric/Behavioral:  Negative for depression. The patient is not nervous/anxious.        Procedures    Procedures    No orders to display           Objective:    /61 (BP Location: Right arm, Patient Position: Sitting, Cuff Size: Large Adult)   Pulse 86   Resp 16   Ht 177.8 cm (70\")   Wt 102 kg (224 lb)   SpO2 94%   BMI 32.14 kg/m²         Constitutional:       Appearance: Well-developed.   Eyes:      General: No scleral icterus.        Right eye: No discharge.   HENT: "      Head: Normocephalic and atraumatic.   Neck:      Thyroid: No thyromegaly.      Lymphadenopathy: No cervical adenopathy.   Pulmonary:      Effort: Pulmonary effort is normal. No respiratory distress.      Breath sounds: Normal breath sounds. No wheezing. No rales.   Cardiovascular:      Normal rate. Regular rhythm.      No gallop.    Edema:     Peripheral edema absent.   Abdominal:      Tenderness: There is no abdominal tenderness.   Skin:     Findings: No erythema or rash.   Neurological:      Mental Status: Alert and oriented to person, place, and time.             Assessment:       Diagnosis Plan   1. Essential hypertension        2. Precordial pain        3. Shortness of breath                 Plan:       MDM:    1.  Precordial chest pain:    Patient is having symptom of chest pain and underwent stress test.  Stress test was negative.  Chest pain is contained continue to observe no further cardiac workup    2.  Shortness of breath:    Most likely cause of shortness of breath is underlying COPD.  Patient was a smoker in the past.    3.  Hypertension:    Blood pressure is controlled currently

## 2024-04-12 ENCOUNTER — OFFICE VISIT (OUTPATIENT)
Dept: CARDIOLOGY | Facility: CLINIC | Age: 64
End: 2024-04-12
Payer: COMMERCIAL

## 2024-04-12 VITALS
HEIGHT: 70 IN | SYSTOLIC BLOOD PRESSURE: 133 MMHG | HEART RATE: 86 BPM | BODY MASS INDEX: 32.07 KG/M2 | WEIGHT: 224 LBS | OXYGEN SATURATION: 94 % | DIASTOLIC BLOOD PRESSURE: 61 MMHG | RESPIRATION RATE: 16 BRPM

## 2024-04-12 DIAGNOSIS — R07.2 PRECORDIAL PAIN: ICD-10-CM

## 2024-04-12 DIAGNOSIS — R06.02 SHORTNESS OF BREATH: ICD-10-CM

## 2024-04-12 DIAGNOSIS — I10 ESSENTIAL HYPERTENSION: Primary | ICD-10-CM

## 2024-04-12 PROCEDURE — 99204 OFFICE O/P NEW MOD 45 MIN: CPT | Performed by: INTERNAL MEDICINE

## 2024-04-15 ENCOUNTER — PATIENT ROUNDING (BHMG ONLY) (OUTPATIENT)
Dept: CARDIOLOGY | Facility: CLINIC | Age: 64
End: 2024-04-15
Payer: COMMERCIAL

## 2024-04-15 NOTE — PROGRESS NOTES
A My-Chart message has been sent to the patient for PATIENT ROUNDING with [facility].

## 2024-04-17 DIAGNOSIS — R06.02 SHORTNESS OF BREATH: ICD-10-CM

## 2024-04-17 RX ORDER — ALBUTEROL SULFATE 90 UG/1
2 AEROSOL, METERED RESPIRATORY (INHALATION) EVERY 4 HOURS PRN
Qty: 6.7 G | Refills: 0 | Status: SHIPPED | OUTPATIENT
Start: 2024-04-17

## 2024-05-16 ENCOUNTER — TELEPHONE (OUTPATIENT)
Dept: FAMILY MEDICINE CLINIC | Facility: CLINIC | Age: 64
End: 2024-05-16

## 2024-06-23 DIAGNOSIS — R05.9 COUGH IN ADULT: ICD-10-CM

## 2024-06-23 DIAGNOSIS — I10 ESSENTIAL HYPERTENSION: ICD-10-CM

## 2024-06-24 RX ORDER — LOSARTAN POTASSIUM 50 MG/1
50 TABLET ORAL DAILY
Qty: 30 TABLET | Refills: 0 | Status: SHIPPED | OUTPATIENT
Start: 2024-06-24

## 2024-06-24 RX ORDER — OMEPRAZOLE 20 MG/1
20 CAPSULE, DELAYED RELEASE ORAL DAILY
Qty: 30 CAPSULE | Refills: 0 | Status: SHIPPED | OUTPATIENT
Start: 2024-06-24

## 2024-08-14 DIAGNOSIS — I10 ESSENTIAL HYPERTENSION: ICD-10-CM

## 2024-08-14 DIAGNOSIS — R05.9 COUGH IN ADULT: ICD-10-CM

## 2024-08-14 RX ORDER — OMEPRAZOLE 20 MG/1
20 CAPSULE, DELAYED RELEASE ORAL DAILY
Qty: 30 CAPSULE | Refills: 0 | Status: SHIPPED | OUTPATIENT
Start: 2024-08-14

## 2024-08-14 RX ORDER — LOSARTAN POTASSIUM 50 MG/1
50 TABLET ORAL DAILY
Qty: 30 TABLET | Refills: 0 | Status: SHIPPED | OUTPATIENT
Start: 2024-08-14

## 2024-09-25 DIAGNOSIS — I10 ESSENTIAL HYPERTENSION: ICD-10-CM

## 2024-09-25 DIAGNOSIS — R05.9 COUGH IN ADULT: ICD-10-CM

## 2024-09-25 RX ORDER — LOSARTAN POTASSIUM 50 MG/1
50 TABLET ORAL DAILY
Qty: 30 TABLET | Refills: 0 | Status: SHIPPED | OUTPATIENT
Start: 2024-09-25

## 2024-11-19 DIAGNOSIS — R05.9 COUGH IN ADULT: ICD-10-CM

## 2024-11-19 DIAGNOSIS — I10 ESSENTIAL HYPERTENSION: ICD-10-CM

## 2024-11-19 RX ORDER — LOSARTAN POTASSIUM 50 MG/1
50 TABLET ORAL DAILY
Qty: 30 TABLET | Refills: 0 | Status: SHIPPED | OUTPATIENT
Start: 2024-11-19

## 2024-12-17 DIAGNOSIS — I10 ESSENTIAL HYPERTENSION: ICD-10-CM

## 2024-12-17 DIAGNOSIS — R05.9 COUGH IN ADULT: ICD-10-CM

## 2024-12-17 RX ORDER — LOSARTAN POTASSIUM 50 MG/1
50 TABLET ORAL DAILY
Qty: 30 TABLET | Refills: 0 | Status: SHIPPED | OUTPATIENT
Start: 2024-12-17

## 2025-02-07 DIAGNOSIS — R05.9 COUGH IN ADULT: ICD-10-CM

## 2025-02-07 DIAGNOSIS — I10 ESSENTIAL HYPERTENSION: ICD-10-CM

## 2025-02-07 RX ORDER — LOSARTAN POTASSIUM 50 MG/1
50 TABLET ORAL DAILY
Qty: 30 TABLET | Refills: 0 | Status: SHIPPED | OUTPATIENT
Start: 2025-02-07

## 2025-02-11 DIAGNOSIS — I10 ESSENTIAL HYPERTENSION: ICD-10-CM

## 2025-02-11 DIAGNOSIS — R05.9 COUGH IN ADULT: ICD-10-CM

## 2025-02-11 RX ORDER — LOSARTAN POTASSIUM 50 MG/1
50 TABLET ORAL DAILY
Qty: 30 TABLET | Refills: 0 | OUTPATIENT
Start: 2025-02-11

## 2025-03-03 DIAGNOSIS — R05.9 COUGH IN ADULT: ICD-10-CM

## 2025-03-03 RX ORDER — OMEPRAZOLE 20 MG/1
20 CAPSULE, DELAYED RELEASE ORAL DAILY
Qty: 30 CAPSULE | Refills: 0 | Status: SHIPPED | OUTPATIENT
Start: 2025-03-03

## 2025-03-04 ENCOUNTER — TELEPHONE (OUTPATIENT)
Dept: FAMILY MEDICINE CLINIC | Facility: CLINIC | Age: 65
End: 2025-03-04

## 2025-03-04 NOTE — TELEPHONE ENCOUNTER
Caller: Francis Dunlap    Relationship: Self    Best call back number: [phone]     What was the call regarding: PATIENT WOULD LIKE TO GET LAB WORK BEFORE HIS APPOINTMENT TODAY IF ABLE. PLEASE CALL TO FOLLOW UP.

## 2025-03-07 DIAGNOSIS — I10 ESSENTIAL HYPERTENSION: ICD-10-CM

## 2025-03-07 RX ORDER — LOSARTAN POTASSIUM 50 MG/1
50 TABLET ORAL DAILY
Qty: 30 TABLET | Refills: 0 | Status: SHIPPED | OUTPATIENT
Start: 2025-03-07

## 2025-03-31 DIAGNOSIS — I10 ESSENTIAL HYPERTENSION: ICD-10-CM

## 2025-03-31 RX ORDER — LOSARTAN POTASSIUM 50 MG/1
50 TABLET ORAL DAILY
Qty: 30 TABLET | Refills: 0 | Status: SHIPPED | OUTPATIENT
Start: 2025-03-31

## 2025-04-04 DIAGNOSIS — R05.9 COUGH IN ADULT: ICD-10-CM

## 2025-04-04 RX ORDER — OMEPRAZOLE 20 MG/1
20 CAPSULE, DELAYED RELEASE ORAL DAILY
Qty: 30 CAPSULE | Refills: 0 | OUTPATIENT
Start: 2025-04-04

## 2025-05-08 DIAGNOSIS — I10 ESSENTIAL HYPERTENSION: ICD-10-CM

## 2025-05-08 RX ORDER — LOSARTAN POTASSIUM 50 MG/1
50 TABLET ORAL DAILY
Qty: 30 TABLET | Refills: 0 | Status: CANCELLED | OUTPATIENT
Start: 2025-05-08

## 2025-05-08 RX ORDER — LOSARTAN POTASSIUM 50 MG/1
50 TABLET ORAL DAILY
Qty: 30 TABLET | Refills: 0 | Status: SHIPPED | OUTPATIENT
Start: 2025-05-08 | End: 2025-05-12

## 2025-05-10 DIAGNOSIS — I10 ESSENTIAL HYPERTENSION: ICD-10-CM

## 2025-05-12 RX ORDER — LOSARTAN POTASSIUM 50 MG/1
50 TABLET ORAL DAILY
Qty: 30 TABLET | Refills: 0 | Status: SHIPPED | OUTPATIENT
Start: 2025-05-12

## 2025-05-16 ENCOUNTER — TELEPHONE (OUTPATIENT)
Dept: FAMILY MEDICINE CLINIC | Facility: CLINIC | Age: 65
End: 2025-05-16
Payer: MEDICARE

## 2025-05-16 NOTE — TELEPHONE ENCOUNTER
Pt has orders for overdue labs but it looks like he has established care with another provider outside of [facility]  Can I cancel the lab orders?

## 2025-06-03 DIAGNOSIS — I10 ESSENTIAL HYPERTENSION: ICD-10-CM

## 2025-06-03 RX ORDER — LOSARTAN POTASSIUM 50 MG/1
50 TABLET ORAL DAILY
Qty: 30 TABLET | Refills: 0 | OUTPATIENT
Start: 2025-06-03

## 2025-06-12 DIAGNOSIS — R05.9 COUGH IN ADULT: ICD-10-CM

## 2025-06-12 RX ORDER — OMEPRAZOLE 20 MG/1
20 CAPSULE, DELAYED RELEASE ORAL DAILY
Qty: 30 CAPSULE | Refills: 0 | OUTPATIENT
Start: 2025-06-12

## 2025-07-29 DIAGNOSIS — R05.9 COUGH IN ADULT: ICD-10-CM

## 2025-07-30 RX ORDER — OMEPRAZOLE 20 MG/1
20 CAPSULE, DELAYED RELEASE ORAL DAILY
Qty: 30 CAPSULE | Refills: 0 | OUTPATIENT
Start: 2025-07-30